# Patient Record
Sex: MALE | Race: WHITE | Employment: OTHER | URBAN - METROPOLITAN AREA
[De-identification: names, ages, dates, MRNs, and addresses within clinical notes are randomized per-mention and may not be internally consistent; named-entity substitution may affect disease eponyms.]

---

## 2017-10-11 ENCOUNTER — OFFICE VISIT (OUTPATIENT)
Dept: CARDIOLOGY CLINIC | Age: 62
End: 2017-10-11

## 2017-10-11 VITALS
HEIGHT: 70 IN | DIASTOLIC BLOOD PRESSURE: 78 MMHG | WEIGHT: 273 LBS | HEART RATE: 82 BPM | BODY MASS INDEX: 39.08 KG/M2 | SYSTOLIC BLOOD PRESSURE: 128 MMHG

## 2017-10-11 DIAGNOSIS — I25.10 CORONARY ARTERY DISEASE INVOLVING NATIVE CORONARY ARTERY OF NATIVE HEART WITHOUT ANGINA PECTORIS: Primary | ICD-10-CM

## 2017-10-11 DIAGNOSIS — E78.2 MIXED HYPERLIPIDEMIA: ICD-10-CM

## 2017-10-11 DIAGNOSIS — Z72.0 TOBACCO ABUSE: ICD-10-CM

## 2017-10-11 DIAGNOSIS — I10 ESSENTIAL HYPERTENSION: ICD-10-CM

## 2017-10-11 PROCEDURE — 99214 OFFICE O/P EST MOD 30 MIN: CPT | Performed by: INTERNAL MEDICINE

## 2017-10-11 RX ORDER — ATORVASTATIN CALCIUM 40 MG/1
40 TABLET, FILM COATED ORAL NIGHTLY
Qty: 30 TABLET | Refills: 11 | Status: SHIPPED | OUTPATIENT
Start: 2017-10-11 | End: 2018-09-27 | Stop reason: SDUPTHER

## 2017-10-11 RX ORDER — ISOSORBIDE MONONITRATE 30 MG/1
30 TABLET, EXTENDED RELEASE ORAL DAILY
Qty: 30 TABLET | Refills: 11 | Status: SHIPPED | OUTPATIENT
Start: 2017-10-11 | End: 2018-09-27 | Stop reason: SDUPTHER

## 2017-10-11 RX ORDER — NITROGLYCERIN 0.4 MG/1
0.4 TABLET SUBLINGUAL EVERY 5 MIN PRN
Qty: 25 TABLET | Refills: 3 | Status: SHIPPED | OUTPATIENT
Start: 2017-10-11 | End: 2018-11-12 | Stop reason: SDUPTHER

## 2017-10-11 RX ORDER — AMLODIPINE BESYLATE 5 MG/1
5 TABLET ORAL DAILY
Qty: 30 TABLET | Refills: 11 | Status: SHIPPED | OUTPATIENT
Start: 2017-10-11 | End: 2018-09-27 | Stop reason: SDUPTHER

## 2017-10-11 NOTE — PATIENT INSTRUCTIONS
Recommendation:  - He is doing fairly well on his current antianginal therapy. 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 We ordered lipid panel on his last visit a year ago, but were not done. His triglycerides were elevated likely due to non-compliance with diet. I educated him about adherence to low fat and salt diet. Will repeat his lipid panel and LFTs. He was counseled for smoking cessation.   - He will follow up with me in 12 months.

## 2017-10-11 NOTE — PROGRESS NOTES
Section of Cardiology                                     Cardiovascular Evaluation      PATIENT: Daxa Harp  DATE: 10/11/2017  MRN: [id number]  CSN: [id number]  : 1955    Primary Care Doctor: No primary care provider on file. Reason for evaluation:   1 Year Follow Up; Coronary Artery Disease; and Shortness of Breath      History of present illness:  Daxa Harp is a 58 y.o. patient who presents for hospital follow up. He presented with chest pain. He underwent a stress test and cardiac cath. Medical management was recommended for branch vessel CAD.     Today he reports he is here for follow up of CAD, HTN and HLD. He  feels well over all. He states he has some SOB and is still smoking. He has recently retired. He has not had any further chest pain. He is tolerating his medications well. He denies chest pain, palpitations, syncope, dizziness or shortness of breath. Past Medical History:   has a past medical history of COPD (chronic obstructive pulmonary disease) ([facility] [address] Surgical History:   has a past surgical history that includes Coronary angioplasty (06/10/2016). Social History:   reports that he has been smoking Cigarettes. He has been smoking about 0.75 packs per day. He does not have any smokeless tobacco history on file. He reports that he does not drink alcohol or use drugs. Family History:  No evidence for sudden cardiac death or premature CAD    Home Medications:  Reviewed and are listed in nursing record.  and/or listed below  Current Outpatient Prescriptions   Medication Sig Dispense Refill    amLODIPine (NORVASC) 5 MG tablet Take 1 tablet by mouth daily 30 tablet 11    isosorbide mononitrate (IMDUR) 30 MG extended release tablet Take 1 tablet by mouth daily 30 tablet 11    metoprolol tartrate (LOPRESSOR) 25 MG tablet Take 1 tablet by mouth 2 times daily 60 tablet 11    atorvastatin (LIPITOR) 40 MG tablet Take 1 tablet by mouth nightly 30 tablet 11    nicotine (NICODERM CQ) 14 MG/24HR Place 1 patch onto the skin daily 30 patch 3    nitroGLYCERIN (NITROSTAT) 0.4 MG SL tablet Place 1 tablet under the tongue every 5 minutes as needed for Chest pain 25 tablet 3    aspirin 81 MG chewable tablet Take 1 tablet by mouth daily 30 tablet 3     No current facility-administered medications for this visit. Allergies:  Valium     Review of Systems:   All 14 point review of symptoms completed. Pertinent positives identified in the HPI, all other review of symptoms negative. Physical Examination:    /78   Pulse 82   Ht 5' 10\" (1.778 m)   Wt 273 lb (123.8 kg)   BMI 39.17 kg/m²        General Appearance:  Alert, cooperative, no distress, appears stated age       Head:  Normocephalic, without obvious abnormality, atraumatic   Eyes:  PERRL, conjunctiva/corneas clear       Nose: Nares normal, no drainage or sinus tenderness   Throat: Lips, mucosa, and tongue normal       Neck: Supple, symmetrical, trachea midline, no adenopathy, thyroid: not enlarged, symmetric, no tenderness/mass/nodules, no carotid bruit or JVD       Lungs:   Clear to auscultation bilaterally, respirations unlabored   Chest Wall:  No tenderness or deformity       Heart:  Regular rhythm and normal rate; S1, S2 are normal; no murmur noted; no rub or gallop       Abdomen:   Soft, non-tender, bowel sounds active all four quadrants,  no masses, no organomegaly       Extremities: No cyanosis or edema. No deformity of hands. Pulses: 2+ and symmetric       Skin: Skin color, texture, turgor normal. No rashes or lesions       Pysch: Normal mood and affect.  Alert, oriented x 3       Neurologic: Normal gross motor and sensory exam.       DIAGNOSTIC WORK UP:  Lab Data:  CBC:   Lab Results   Component Value Date    WBC 6.9 06/10/2016    HGB 15.6 06/10/2016    HCT 46.2 06/10/2016    MCV 90.6 06/10/2016     06/10/2016     BMP:   Lab Results   Component Value Date     06/10/2016    K 3.8

## 2017-11-08 ENCOUNTER — OFFICE VISIT (OUTPATIENT)
Dept: FAMILY MEDICINE CLINIC | Age: 62
End: 2017-11-08

## 2017-11-08 VITALS
OXYGEN SATURATION: 98 % | HEART RATE: 76 BPM | HEIGHT: 70 IN | WEIGHT: 274.1 LBS | BODY MASS INDEX: 39.24 KG/M2 | DIASTOLIC BLOOD PRESSURE: 78 MMHG | TEMPERATURE: 97.8 F | SYSTOLIC BLOOD PRESSURE: 116 MMHG | RESPIRATION RATE: 16 BRPM

## 2017-11-08 DIAGNOSIS — Z12.5 SCREENING PSA (PROSTATE SPECIFIC ANTIGEN): ICD-10-CM

## 2017-11-08 DIAGNOSIS — R39.89 HARD, FIRM PROSTATE: Primary | ICD-10-CM

## 2017-11-08 DIAGNOSIS — E78.2 MIXED HYPERLIPIDEMIA: ICD-10-CM

## 2017-11-08 DIAGNOSIS — I25.10 CORONARY ARTERY DISEASE DUE TO LIPID RICH PLAQUE: ICD-10-CM

## 2017-11-08 DIAGNOSIS — F17.200 TOBACCO USE DISORDER: ICD-10-CM

## 2017-11-08 DIAGNOSIS — I25.83 CORONARY ARTERY DISEASE DUE TO LIPID RICH PLAQUE: ICD-10-CM

## 2017-11-08 DIAGNOSIS — N40.1 BENIGN PROSTATIC HYPERPLASIA WITH NOCTURIA: ICD-10-CM

## 2017-11-08 DIAGNOSIS — I10 ESSENTIAL HYPERTENSION: ICD-10-CM

## 2017-11-08 DIAGNOSIS — Z12.11 COLON CANCER SCREENING: ICD-10-CM

## 2017-11-08 DIAGNOSIS — R35.1 BENIGN PROSTATIC HYPERPLASIA WITH NOCTURIA: ICD-10-CM

## 2017-11-08 LAB
HEMOCCULT STL QL: NEGATIVE
HEMOCCULT STL QL: NORMAL
HEMOCCULT STL QL: NORMAL

## 2017-11-08 PROCEDURE — 82270 OCCULT BLOOD FECES: CPT | Performed by: FAMILY MEDICINE

## 2017-11-08 PROCEDURE — 99204 OFFICE O/P NEW MOD 45 MIN: CPT | Performed by: FAMILY MEDICINE

## 2017-11-08 RX ORDER — TAMSULOSIN HYDROCHLORIDE 0.4 MG/1
0.4 CAPSULE ORAL DAILY
Qty: 30 CAPSULE | Refills: 0 | Status: SHIPPED | OUTPATIENT
Start: 2017-11-08 | End: 2017-11-29 | Stop reason: SDUPTHER

## 2017-11-08 RX ORDER — NICOTINE 21 MG/24HR
1 PATCH, TRANSDERMAL 24 HOURS TRANSDERMAL EVERY 24 HOURS
Qty: 30 PATCH | Refills: 0 | Status: SHIPPED | OUTPATIENT
Start: 2017-11-08 | End: 2018-03-01 | Stop reason: CLARIF

## 2017-11-08 ASSESSMENT — ENCOUNTER SYMPTOMS
SHORTNESS OF BREATH: 0
BLOOD IN STOOL: 0
CHEST TIGHTNESS: 0
APNEA: 0
VOMITING: 0
NAUSEA: 0
EYE ITCHING: 0
SINUS PAIN: 0
STRIDOR: 0
BACK PAIN: 0
SORE THROAT: 0
ABDOMINAL DISTENTION: 0
RECTAL PAIN: 0
EYE REDNESS: 0
CONSTIPATION: 0
COLOR CHANGE: 0
COUGH: 0
RHINORRHEA: 0
DIARRHEA: 0
CHOKING: 0
WHEEZING: 0
ANAL BLEEDING: 0
PHOTOPHOBIA: 0
VOICE CHANGE: 0
EYE DISCHARGE: 0
ABDOMINAL PAIN: 0
EYE PAIN: 0
TROUBLE SWALLOWING: 0
FACIAL SWELLING: 0
SINUS PRESSURE: 0

## 2017-11-08 NOTE — PATIENT INSTRUCTIONS
Patient Education        Benign Prostatic Hyperplasia: Care Instructions  Your Care Instructions    Benign prostatic hyperplasia, or BPH, is an enlarged prostate gland. The prostate is a small gland that makes some of the fluid in semen. Prostate enlargement happens to almost all men as they age. It is usually not serious. BPH does not cause prostate cancer. As the prostate gets bigger, it may partly block the flow of urine. You may have a hard time getting a urine stream started or completely stopped. BPH can cause dribbling. You may have a weak urine stream, or you may have to urinate more often than you used to, especially at night. Most men find these problems easy to manage. You do not need treatment unless your symptoms bother you a lot or you have other problems, such as bladder infections or stones. In these cases, medicines may help. Surgery is not needed unless the urine flow is blocked or the symptoms do not get better with medicine. Follow-up care is a key part of your treatment and safety. Be sure to make and go to all appointments, and call your doctor if you are having problems. It's also a good idea to know your test results and keep a list of the medicines you take. How can you care for yourself at home? · Take plenty of time to urinate. Try to relax. · Try \"double voiding. \" Urinate as much you can, relax for a few moments, and then try to urinate again. · Sit on the toilet to urinate. · Read or think of other things while you are waiting. · Turn on a faucet, or try to picture running water. Some men find that this helps get their urine flowing. · If dribbling is a problem, wash your penis daily to avoid skin irritation and infection. · Avoid caffeine and alcohol. These drinks will increase how often you need to urinate. Spread your fluid intake throughout the day. If the urge to urinate often wakes you at night, limit your fluid intake in the evening.  Urinate right before you go to

## 2017-11-08 NOTE — PROGRESS NOTES
Subjective:      Patient ID: Joshua Neri is a 58 y.o. male. HPI  Establish as new patient:relocating pcp due to convenience. Presenting w/new complaint of gradual onset mild nocturia x 2years. Not worsening. Associated w/occasional dribbling. Worsened(aggravated) by nothing. Improves with nothing. Self tx:none. PSA:no prior testing. Fhx or personal hx of prostate cancer:no. Denies urinary hisitancy/weak stream/daytime urinary frequency/dysuria/urgency/fever/chills/low back pain/n-v.    HTN:CAD/Hyperlipidemia:per cards. Office notes via EPIC reviewed today. Smokes 1ppd:wishes to quit using nicotine patches. Colonoscopy:wishes to proceed w/referral for routine screening. No prior colonoscopy. Risks vs benefits of PSA screening reviewed:pt' wishes to proceed w/testing. Allergies   Allergen Reactions    Valium        Current Outpatient Prescriptions on File Prior to Visit   Medication Sig Dispense Refill    isosorbide mononitrate (IMDUR) 30 MG extended release tablet Take 1 tablet by mouth daily 30 tablet 11    amLODIPine (NORVASC) 5 MG tablet Take 1 tablet by mouth daily 30 tablet 11    metoprolol tartrate (LOPRESSOR) 25 MG tablet Take 1 tablet by mouth 2 times daily 60 tablet 11    atorvastatin (LIPITOR) 40 MG tablet Take 1 tablet by mouth nightly 30 tablet 11    nitroGLYCERIN (NITROSTAT) 0.4 MG SL tablet Place 1 tablet under the tongue every 5 minutes as needed for Chest pain 25 tablet 3    aspirin 81 MG chewable tablet Take 1 tablet by mouth daily 30 tablet 3    nicotine (NICODERM CQ) 14 MG/24HR Place 1 patch onto the skin daily 30 patch 3     No current facility-administered medications on file prior to visit.         Past Medical History:   Diagnosis Date    CAD (coronary artery disease)     Managed [facility]ology[facility]Dr. Mavis Pena COPD (chronic obstructive pulmonary disease) (HCC)     HTN (hypertension)     Managed by cardiology([facility]):Dr. Mavis Pena Hyperlipidemia     Managed by cardiology([facility]):Dr. Facundo Paula       Past Surgical History:   Procedure Laterality Date    WISDOM TOOTH EXTRACTION         Social History   Substance Use Topics    Smoking status: Current Every Day Smoker     Packs/day: 1.00     Years: 30.00     Types: Cigarettes    Smokeless tobacco: Never Used    Alcohol use No     History   Drug Use No       Family History   Problem Relation Age of Onset    No Known Problems Mother     No Known Problems Father     No Known Problems Sister     Heart Disease Brother     No Known Problems Maternal Grandmother     No Known Problems Maternal Grandfather     No Known Problems Paternal Grandmother     No Known Problems Paternal Grandfather          Review of Systems   Constitutional: Negative for activity change, appetite change, chills, diaphoresis, fatigue, fever and unexpected weight change. Negative for:Difficulty sleeping/night sweats/unexplained weight loss or gain/malaise   HENT: Negative for congestion, dental problem, drooling, ear discharge, ear pain, facial swelling, hearing loss, mouth sores, nosebleeds, postnasal drip, rhinorrhea, sinus pain, sinus pressure, sneezing, sore throat, tinnitus, trouble swallowing and voice change. Negative for:Dizziness/vertigo   Eyes: Negative for photophobia, pain, discharge, redness, itching and visual disturbance. Respiratory: Negative for apnea, cough, choking, chest tightness, shortness of breath, wheezing and stridor. Negative for:Hemoptysis/hoarseness/pain on inspiration. Breasts:tenderness/masses/nipple discharge/skin changes. Cardiovascular: Negative for chest pain, palpitations and leg swelling. Negative for:Syncope/presyncope/lower extremity edema/claudication/PND/irregular heart beats   Gastrointestinal: Negative for abdominal distention, abdominal pain, anal bleeding, blood in stool, constipation, diarrhea, nausea, rectal pain and vomiting. Negative for:Melena/bloating/dysphagia/reflux/loss of appetite   Endocrine: Negative for cold intolerance, heat intolerance, polydipsia, polyphagia and polyuria. Genitourinary: Negative for decreased urine volume, difficulty urinating, discharge, dysuria, enuresis, flank pain, frequency, genital sores, hematuria, penile pain, penile swelling, scrotal swelling, testicular pain and urgency. Musculoskeletal: Negative for arthralgias, back pain, gait problem, joint swelling, myalgias, neck pain and neck stiffness. Skin: Negative for color change, pallor, rash and wound. Neurological: Negative for dizziness, tremors, seizures, syncope, facial asymmetry, speech difficulty, weakness, light-headedness, numbness and headaches. Negative for:Visual disturbance/muscular weakness/paralysis/memory problems. Hematological: Negative for adenopathy. Does not bruise/bleed easily. Negative for:Lymph node tenderness   Psychiatric/Behavioral: Negative for agitation, behavioral problems, confusion, decreased concentration, dysphoric mood, hallucinations, self-injury, sleep disturbance and suicidal ideas. The patient is not nervous/anxious and is not hyperactive. Negative for:Homicidal tendencies(HI)       Objective:   Physical Exam   Constitutional: He is oriented to person, place, and time. Vital signs are normal. He appears well-developed and well-nourished. He is cooperative. He does not have a sickly appearance. No distress. Well hydrated/well appearing. HENT:   Head: Normocephalic and atraumatic. Right Ear: Hearing, tympanic membrane, external ear and ear canal normal.   Left Ear: Hearing, tympanic membrane, external ear and ear canal normal.   Nose: Nose normal.   Mouth/Throat: Uvula is midline, oropharynx is clear and moist and mucous membranes are normal. No oropharyngeal exudate. Eyes: Conjunctivae, EOM and lids are normal. Pupils are equal, round, and reactive to light.    Neck: Trachea labs/diagnostic tests as discussed today & call back for results within 2-7days. Pt' left office in good condition. Advised to go to local ER or call 911 for any worrisome signs/sx including but not limited to worsening of current complaint.   Advised release of medical records from all prior physicians(including PCP/specialists)

## 2017-11-13 LAB
BACTERIA: ABNORMAL /HPF
BILIRUBIN URINE: NEGATIVE
BLOOD, URINE: NEGATIVE
CLARITY: ABNORMAL
COLOR: ABNORMAL
CRYSTALS, UA: ABNORMAL /HPF
EPITHELIAL CELLS, UA: 9 /HPF (ref 0–5)
GLUCOSE URINE: NEGATIVE MG/DL
HYALINE CASTS: 2 /LPF (ref 0–8)
KETONES, URINE: ABNORMAL MG/DL
LEUKOCYTE ESTERASE, URINE: ABNORMAL
MICROSCOPIC EXAMINATION: YES
NITRITE, URINE: NEGATIVE
PH UA: 6
PROTEIN UA: NEGATIVE MG/DL
RBC UA: 5 /HPF (ref 0–4)
SPECIFIC GRAVITY UA: 1.02
URINE TYPE: ABNORMAL
UROBILINOGEN, URINE: 1 E.U./DL
WBC UA: 10 /HPF (ref 0–5)

## 2017-11-14 DIAGNOSIS — R82.998 LEUKOCYTES IN URINE: Primary | ICD-10-CM

## 2017-11-15 LAB — URINE CULTURE, ROUTINE: NORMAL

## 2017-11-29 ENCOUNTER — OFFICE VISIT (OUTPATIENT)
Dept: FAMILY MEDICINE CLINIC | Age: 62
End: 2017-11-29

## 2017-11-29 VITALS
DIASTOLIC BLOOD PRESSURE: 68 MMHG | HEIGHT: 70 IN | SYSTOLIC BLOOD PRESSURE: 116 MMHG | WEIGHT: 275.2 LBS | BODY MASS INDEX: 39.4 KG/M2 | RESPIRATION RATE: 16 BRPM | HEART RATE: 75 BPM

## 2017-11-29 DIAGNOSIS — Z72.0 TOBACCO ABUSE: ICD-10-CM

## 2017-11-29 DIAGNOSIS — I10 ESSENTIAL HYPERTENSION: ICD-10-CM

## 2017-11-29 DIAGNOSIS — R35.1 BENIGN PROSTATIC HYPERPLASIA WITH NOCTURIA: Primary | ICD-10-CM

## 2017-11-29 DIAGNOSIS — N40.1 BENIGN PROSTATIC HYPERPLASIA WITH NOCTURIA: Primary | ICD-10-CM

## 2017-11-29 DIAGNOSIS — R39.89 HARD, FIRM PROSTATE: ICD-10-CM

## 2017-11-29 PROCEDURE — 99213 OFFICE O/P EST LOW 20 MIN: CPT | Performed by: FAMILY MEDICINE

## 2017-11-29 RX ORDER — TAMSULOSIN HYDROCHLORIDE 0.4 MG/1
0.4 CAPSULE ORAL DAILY
Qty: 30 CAPSULE | Refills: 0 | Status: SHIPPED | OUTPATIENT
Start: 2017-11-29 | End: 2018-03-01 | Stop reason: CLARIF

## 2017-11-29 ASSESSMENT — ENCOUNTER SYMPTOMS
COUGH: 0
ABDOMINAL DISTENTION: 0
ABDOMINAL PAIN: 0
CHEST TIGHTNESS: 0
GASTROINTESTINAL NEGATIVE: 1
SHORTNESS OF BREATH: 0

## 2017-11-29 NOTE — PROGRESS NOTES
Subjective:      Patient ID: Druan Ness is a 58 y.o. male. HPI    Results for Wm Ramey (MRN [id number]) as of 11/29/2017 11:09   Ref. Range 11/13/2017 15:03   Color, UA Latest Ref Range: Straw/Yellow  DK YELLOW   Clarity, UA Latest Ref Range: Clear  CLOUDY (A)   Glucose, UA Latest Ref Range: Negative mg/dL Negative   Bilirubin, Urine Latest Ref Range: Negative  Negative   Ketones, Urine Latest Ref Range: Negative mg/dL TRACE (A)   Specific Dorena, UA Latest Ref Range: 1.005 - 1.030  1.023   Blood, Urine Latest Ref Range: Negative  Negative   pH, UA Latest Ref Range: 5.0 - 8.0  6.0   Protein, UA Latest Ref Range: Negative mg/dL Negative   Urobilinogen, Urine Latest Ref Range: <2.0 E.U./dL 1.0   Nitrite, Urine Latest Ref Range: Negative  Negative   Leukocyte Esterase, Urine Latest Ref Range: Negative  SMALL (A)   Urine Type Unknown Clean catch   Hyaline Casts, UA Latest Ref Range: 0 - 8 /LPF 2   WBC, UA Latest Ref Range: 0 - 5 /HPF 10 (H)   RBC, UA Latest Ref Range: 0 - 4 /HPF 5 (H)   Epi Cells Latest Ref Range: 0 - 5 /HPF 9 (H)   Bacteria, UA Latest Units: /HPF 1+ (A)   Crystals Latest Units: /HPF 1+ Ca. Oxalate (A)   Microscopic Examination Unknown YES   Urine Culture, Routine Unknown No growth at 18-3... F/u to [facility] well with flomax. No med side effects. Associated w/occasional dribbling that is better. Worsened(aggravated) by nothing. Improves with flomax. PSA:lab pending. Urology appt(hard firm prostate):pending:pt' states he has been contacted bu urology & will call them back to schedule. Fhx or personal hx of prostate cancer:no. Denies urinary hisitancy/weak stream/daytime urinary frequency/dysuria/urgency/fever/chills/low back pain/n-v.    UA see above:calcium oxalate crystals\"per pt' no sx. Denies flank pain. HTN:CAD/Hyperlipidemia:per cards. Doing well. Smokes 1ppd:wishes to quit using nicotine patches.       Allergies   Allergen Reactions    Valium        Current Outpatient Prescriptions on File Prior to Visit   Medication Sig Dispense Refill    tamsulosin (FLOMAX) 0.4 MG capsule Take 1 capsule by mouth daily 30 capsule 0    isosorbide mononitrate (IMDUR) 30 MG extended release tablet Take 1 tablet by mouth daily 30 tablet 11    amLODIPine (NORVASC) 5 MG tablet Take 1 tablet by mouth daily 30 tablet 11    metoprolol tartrate (LOPRESSOR) 25 MG tablet Take 1 tablet by mouth 2 times daily 60 tablet 11    atorvastatin (LIPITOR) 40 MG tablet Take 1 tablet by mouth nightly 30 tablet 11    aspirin 81 MG chewable tablet Take 1 tablet by mouth daily 30 tablet 3    nicotine (NICODERM CQ) 21 MG/24HR Place 1 patch onto the skin every 24 hours 30 patch 0    nitroGLYCERIN (NITROSTAT) 0.4 MG SL tablet Place 1 tablet under the tongue every 5 minutes as needed for Chest pain 25 tablet 3    nicotine (NICODERM CQ) 14 MG/24HR Place 1 patch onto the skin daily 30 patch 3     No current facility-administered medications on file prior to visit. Past Medical History:   Diagnosis Date    Benign prostatic hyperplasia with nocturia 11/8/2017    CAD (coronary artery disease)     Managed [facility]ology([facility]):Dr. Agatha Santiago COPD (chronic obstructive pulmonary disease) ([facility] [address]     HTN (hypertension)     Managed by cardiology([facility]):Dr. Agatha Santiago Hyperlipidemia     Managed by cardiology([facility]):Dr. Cheyanne Cohen           Social History   Substance Use Topics    Smoking status: Current Every Day Smoker     Packs/day: 1.00     Years: 30.00     Types: Cigarettes    Smokeless tobacco: Never Used    Alcohol use No     History   Drug Use No           Review of Systems   Constitutional: Negative for activity change, appetite change, chills, diaphoresis, fatigue, fever and unexpected weight change. Respiratory: Negative for cough, chest tightness and shortness of breath. Cardiovascular: Negative for chest pain. Gastrointestinal: Negative.   Negative for abdominal distention and abdominal pain. Genitourinary: Negative for decreased urine volume, difficulty urinating, discharge, dysuria, enuresis, flank pain, frequency, genital sores, hematuria, penile pain, penile swelling, scrotal swelling, testicular pain and urgency. Neurological: Negative for dizziness. Hematological: Negative for adenopathy. Objective:   Physical Exam   Constitutional: Vital signs are normal. He appears well-developed and well-nourished. He is cooperative. He does not have a sickly appearance. No distress. HENT:   Mouth/Throat: Oropharynx is clear and moist and mucous membranes are normal.   Eyes: Conjunctivae are normal. Pupils are equal, round, and reactive to light. No scleral icterus. Cardiovascular: Normal rate, regular rhythm and normal heart sounds. Pulmonary/Chest: Effort normal and breath sounds normal.   CTAB,good AE bilaterally   Abdominal: Soft. Bowel sounds are normal. He exhibits no distension. There is no tenderness. Lymphadenopathy:     He has no cervical adenopathy. Neurological: He is alert. Skin: He is not diaphoretic. No pallor. Assessment:      1. Benign prostatic hyperplasia with nocturia  VSS/well appearing. Stable. Continue med. tamsulosin (FLOMAX) 0.4 MG capsule   2. Hard, firm prostate  Keep urology appt for 2nd opinion. 3. Tobacco abuse  Counseled. Strongly encouraged to quit. 4. Essential hypertension  Stable. Plan:      Obtain labs/diagnostic tests as discussed today & call back for results within 2-7days:has lab orders from prior visit on 11/8/17. Pt' left office in good condition. Advised to go to local ER or call 911 for any worrisome signs/sx.

## 2017-12-06 DIAGNOSIS — R35.1 BENIGN PROSTATIC HYPERPLASIA WITH NOCTURIA: ICD-10-CM

## 2017-12-06 DIAGNOSIS — N40.1 BENIGN PROSTATIC HYPERPLASIA WITH NOCTURIA: ICD-10-CM

## 2017-12-06 RX ORDER — TAMSULOSIN HYDROCHLORIDE 0.4 MG/1
CAPSULE ORAL
Qty: 30 CAPSULE | Refills: 0 | Status: SHIPPED | OUTPATIENT
Start: 2017-12-06 | End: 2017-12-27 | Stop reason: SDUPTHER

## 2017-12-27 DIAGNOSIS — R35.1 BENIGN PROSTATIC HYPERPLASIA WITH NOCTURIA: ICD-10-CM

## 2017-12-27 DIAGNOSIS — N40.1 BENIGN PROSTATIC HYPERPLASIA WITH NOCTURIA: ICD-10-CM

## 2017-12-27 RX ORDER — TAMSULOSIN HYDROCHLORIDE 0.4 MG/1
CAPSULE ORAL
Qty: 30 CAPSULE | Refills: 0 | Status: SHIPPED | OUTPATIENT
Start: 2017-12-27 | End: 2018-01-29 | Stop reason: SDUPTHER

## 2018-01-03 ENCOUNTER — OFFICE VISIT (OUTPATIENT)
Dept: FAMILY MEDICINE CLINIC | Age: 63
End: 2018-01-03

## 2018-01-03 VITALS
WEIGHT: 277.3 LBS | HEART RATE: 84 BPM | RESPIRATION RATE: 16 BRPM | SYSTOLIC BLOOD PRESSURE: 114 MMHG | DIASTOLIC BLOOD PRESSURE: 67 MMHG | BODY MASS INDEX: 39.7 KG/M2 | HEIGHT: 70 IN

## 2018-01-03 DIAGNOSIS — E78.2 MIXED HYPERLIPIDEMIA: ICD-10-CM

## 2018-01-03 DIAGNOSIS — R39.89 HARD, FIRM PROSTATE: ICD-10-CM

## 2018-01-03 DIAGNOSIS — N40.1 BENIGN PROSTATIC HYPERPLASIA WITH NOCTURIA: ICD-10-CM

## 2018-01-03 DIAGNOSIS — R35.1 BENIGN PROSTATIC HYPERPLASIA WITH NOCTURIA: ICD-10-CM

## 2018-01-03 DIAGNOSIS — R82.90 ABNORMAL URINALYSIS: ICD-10-CM

## 2018-01-03 DIAGNOSIS — Z72.0 TOBACCO ABUSE: ICD-10-CM

## 2018-01-03 DIAGNOSIS — I10 ESSENTIAL HYPERTENSION: Primary | ICD-10-CM

## 2018-01-03 DIAGNOSIS — Z12.11 COLON CANCER SCREENING: ICD-10-CM

## 2018-01-03 DIAGNOSIS — I10 ESSENTIAL HYPERTENSION: ICD-10-CM

## 2018-01-03 DIAGNOSIS — Z12.5 SCREENING PSA (PROSTATE SPECIFIC ANTIGEN): ICD-10-CM

## 2018-01-03 LAB
ANION GAP SERPL CALCULATED.3IONS-SCNC: 15 MMOL/L (ref 3–16)
BUN BLDV-MCNC: 8 MG/DL (ref 7–20)
CALCIUM SERPL-MCNC: 9.1 MG/DL (ref 8.3–10.6)
CHLORIDE BLD-SCNC: 99 MMOL/L (ref 99–110)
CO2: 27 MMOL/L (ref 21–32)
CREAT SERPL-MCNC: 0.7 MG/DL (ref 0.8–1.3)
GFR AFRICAN AMERICAN: >60
GFR NON-AFRICAN AMERICAN: >60
GLUCOSE BLD-MCNC: 111 MG/DL (ref 70–99)
POTASSIUM SERPL-SCNC: 4.3 MMOL/L (ref 3.5–5.1)
PROSTATE SPECIFIC ANTIGEN: 4.14 NG/ML (ref 0–4)
SODIUM BLD-SCNC: 141 MMOL/L (ref 136–145)

## 2018-01-03 PROCEDURE — 99213 OFFICE O/P EST LOW 20 MIN: CPT | Performed by: FAMILY MEDICINE

## 2018-01-03 ASSESSMENT — ENCOUNTER SYMPTOMS
GASTROINTESTINAL NEGATIVE: 1
COUGH: 0
SHORTNESS OF BREATH: 0
ABDOMINAL PAIN: 0
CHEST TIGHTNESS: 0
ABDOMINAL DISTENTION: 0

## 2018-01-04 ENCOUNTER — TELEPHONE (OUTPATIENT)
Dept: CARDIOLOGY CLINIC | Age: 63
End: 2018-01-04

## 2018-01-04 DIAGNOSIS — R39.89 HARD, FIRM PROSTATE: ICD-10-CM

## 2018-01-04 DIAGNOSIS — N40.1 BENIGN PROSTATIC HYPERPLASIA WITH NOCTURIA: ICD-10-CM

## 2018-01-04 DIAGNOSIS — R35.1 BENIGN PROSTATIC HYPERPLASIA WITH NOCTURIA: ICD-10-CM

## 2018-01-04 DIAGNOSIS — R97.20 PSA ELEVATION: Primary | ICD-10-CM

## 2018-01-04 NOTE — TELEPHONE ENCOUNTER
Pt was referred to the Urology group but they do not except pt insurance Noe Redman.  Pt wants to get another referral to different urology MD. Thanks

## 2018-01-29 DIAGNOSIS — N40.1 BENIGN PROSTATIC HYPERPLASIA WITH NOCTURIA: ICD-10-CM

## 2018-01-29 DIAGNOSIS — R35.1 BENIGN PROSTATIC HYPERPLASIA WITH NOCTURIA: ICD-10-CM

## 2018-01-29 RX ORDER — TAMSULOSIN HYDROCHLORIDE 0.4 MG/1
CAPSULE ORAL
Qty: 30 CAPSULE | Refills: 0 | Status: SHIPPED | OUTPATIENT
Start: 2018-01-29 | End: 2018-03-01 | Stop reason: CLARIF

## 2018-02-02 DIAGNOSIS — R35.1 BENIGN PROSTATIC HYPERPLASIA WITH NOCTURIA: ICD-10-CM

## 2018-02-02 DIAGNOSIS — N40.1 BENIGN PROSTATIC HYPERPLASIA WITH NOCTURIA: ICD-10-CM

## 2018-02-02 RX ORDER — TAMSULOSIN HYDROCHLORIDE 0.4 MG/1
CAPSULE ORAL
Qty: 30 CAPSULE | Refills: 0 | Status: SHIPPED | OUTPATIENT
Start: 2018-02-02 | End: 2018-04-16 | Stop reason: SDUPTHER

## 2018-03-01 ENCOUNTER — OFFICE VISIT (OUTPATIENT)
Dept: FAMILY MEDICINE CLINIC | Age: 63
End: 2018-03-01

## 2018-03-01 VITALS
RESPIRATION RATE: 16 BRPM | HEART RATE: 85 BPM | HEIGHT: 70 IN | TEMPERATURE: 97.5 F | WEIGHT: 286.7 LBS | OXYGEN SATURATION: 98 % | BODY MASS INDEX: 41.04 KG/M2 | DIASTOLIC BLOOD PRESSURE: 66 MMHG | SYSTOLIC BLOOD PRESSURE: 108 MMHG

## 2018-03-01 DIAGNOSIS — E78.2 MIXED HYPERLIPIDEMIA: ICD-10-CM

## 2018-03-01 DIAGNOSIS — I25.10 CORONARY ARTERY DISEASE INVOLVING NATIVE CORONARY ARTERY OF NATIVE HEART WITHOUT ANGINA PECTORIS: ICD-10-CM

## 2018-03-01 DIAGNOSIS — R35.1 BENIGN PROSTATIC HYPERPLASIA WITH NOCTURIA: ICD-10-CM

## 2018-03-01 DIAGNOSIS — N40.1 BENIGN PROSTATIC HYPERPLASIA WITH NOCTURIA: ICD-10-CM

## 2018-03-01 DIAGNOSIS — R39.89 HARD, FIRM PROSTATE: ICD-10-CM

## 2018-03-01 DIAGNOSIS — E66.01 MORBID OBESITY (HCC): ICD-10-CM

## 2018-03-01 DIAGNOSIS — R73.9 BLOOD GLUCOSE ELEVATED: ICD-10-CM

## 2018-03-01 DIAGNOSIS — F17.200 TOBACCO USE DISORDER: ICD-10-CM

## 2018-03-01 DIAGNOSIS — R97.20 PSA ELEVATION: ICD-10-CM

## 2018-03-01 DIAGNOSIS — I10 ESSENTIAL HYPERTENSION: Primary | ICD-10-CM

## 2018-03-01 PROCEDURE — 4004F PT TOBACCO SCREEN RCVD TLK: CPT | Performed by: FAMILY MEDICINE

## 2018-03-01 PROCEDURE — G8417 CALC BMI ABV UP PARAM F/U: HCPCS | Performed by: FAMILY MEDICINE

## 2018-03-01 PROCEDURE — G8484 FLU IMMUNIZE NO ADMIN: HCPCS | Performed by: FAMILY MEDICINE

## 2018-03-01 PROCEDURE — 3017F COLORECTAL CA SCREEN DOC REV: CPT | Performed by: FAMILY MEDICINE

## 2018-03-01 PROCEDURE — G8427 DOCREV CUR MEDS BY ELIG CLIN: HCPCS | Performed by: FAMILY MEDICINE

## 2018-03-01 PROCEDURE — 99214 OFFICE O/P EST MOD 30 MIN: CPT | Performed by: FAMILY MEDICINE

## 2018-03-01 PROCEDURE — G8599 NO ASA/ANTIPLAT THER USE RNG: HCPCS | Performed by: FAMILY MEDICINE

## 2018-03-01 ASSESSMENT — ENCOUNTER SYMPTOMS
COUGH: 0
GASTROINTESTINAL NEGATIVE: 1
SHORTNESS OF BREATH: 0
BLOOD IN STOOL: 0
ABDOMINAL DISTENTION: 0
ANAL BLEEDING: 0
VOMITING: 0
CONSTIPATION: 0
ABDOMINAL PAIN: 0
DIARRHEA: 0
NAUSEA: 0
CHEST TIGHTNESS: 0
RECTAL PAIN: 0

## 2018-03-01 NOTE — PATIENT INSTRUCTIONS
arms.  ¨ Lightheadedness or sudden weakness. ¨ A fast or irregular heartbeat. ? · You have symptoms of a stroke. These may include:  ¨ Sudden numbness, tingling, weakness, or loss of movement in your face, arm, or leg, especially on only one side of your body. ¨ Sudden vision changes. ¨ Sudden trouble speaking. ¨ Sudden confusion or trouble understanding simple statements. ¨ Sudden problems with walking or balance. ¨ A sudden, severe headache that is different from past headaches. ? · You have severe back or belly pain. ?Do not wait until your blood pressure comes down on its own. Get help right away. ?Call your doctor now or seek immediate care if:  ? · Your blood pressure is much higher than normal (such as 180/110 or higher), but you don't have symptoms. ? · You think high blood pressure is causing symptoms, such as:  ¨ Severe headache. ¨ Blurry vision. ? Watch closely for changes in your health, and be sure to contact your doctor if:  ? · Your blood pressure measures 140/90 or higher at least 2 times. That means the top number is 140 or higher or the bottom number is 90 or higher, or both. ? · You think you may be having side effects from your blood pressure medicine. ? · Your blood pressure is usually normal, but it goes above normal at least 2 times. Where can you learn more? Go to [email or web address] and sign in to your [employer] account. Enter Z219 in the [employer] box to learn more about \"High Blood Pressure: Care Instructions. \"     If you do not have an account, please click on the \"Sign Up Now\" link. Current as of: Carolyn 10, 2017  Content Version: 11.5  © 7190-2447 [employer], [employer]. Care instructions adapted under license by [facility] ([facility]).  If you have questions about a medical condition or this instruction, always ask your healthcare professional. Ashley Dupont any warranty or liability for your use of this

## 2018-03-01 NOTE — PROGRESS NOTES
Negative. Negative for abdominal distention, abdominal pain, anal bleeding, blood in stool, constipation, diarrhea, nausea, rectal pain and vomiting. Endocrine: Negative for cold intolerance, heat intolerance, polydipsia, polyphagia and polyuria. Genitourinary: Negative for decreased urine volume, difficulty urinating, discharge, dysuria, enuresis, flank pain, frequency, genital sores, hematuria, penile pain, penile swelling, scrotal swelling, testicular pain and urgency. Neurological: Negative for dizziness and weakness. Hematological: Negative for adenopathy. Psychiatric/Behavioral: Negative for sleep disturbance. Objective:   Physical Exam   Constitutional: He is oriented to person, place, and time. Vital signs are normal. He appears well-developed and well-nourished. He is cooperative. He does not have a sickly appearance. No distress. HENT:   Nose: Nose normal.   Mouth/Throat: Uvula is midline, oropharynx is clear and moist and mucous membranes are normal.   Hearing intact to nml conversation   Eyes: Conjunctivae, EOM and lids are normal. Pupils are equal, round, and reactive to light. No scleral icterus. Neck: Trachea normal and normal range of motion. Neck supple. Cardiovascular: Normal rate, regular rhythm, normal heart sounds, intact distal pulses and normal pulses. No ankle edema. Pulmonary/Chest: Effort normal and breath sounds normal.   CTAB,good AE bilaterally   Abdominal: Soft. Normal appearance and bowel sounds are normal. He exhibits no distension and no mass. There is no hepatomegaly. There is no tenderness. Lymphadenopathy:     He has no cervical adenopathy. No supraclavicular LAD. Neurological: He is alert and oriented to person, place, and time. Skin: Skin is warm, dry and intact. He is not diaphoretic. No pallor. Good skin turgor. Capillary refill=2-3 secs. Psychiatric: He has a normal mood and affect. Good eye contact. Assessment:         1.

## 2018-03-21 ENCOUNTER — OFFICE VISIT (OUTPATIENT)
Dept: FAMILY MEDICINE CLINIC | Age: 63
End: 2018-03-21

## 2018-03-21 VITALS
HEIGHT: 70 IN | OXYGEN SATURATION: 98 % | WEIGHT: 280.7 LBS | BODY MASS INDEX: 40.18 KG/M2 | SYSTOLIC BLOOD PRESSURE: 114 MMHG | HEART RATE: 83 BPM | DIASTOLIC BLOOD PRESSURE: 70 MMHG | RESPIRATION RATE: 16 BRPM | TEMPERATURE: 97.9 F

## 2018-03-21 DIAGNOSIS — I83.90 VARICOSE VEIN OF LEG: ICD-10-CM

## 2018-03-21 DIAGNOSIS — I10 ESSENTIAL HYPERTENSION: ICD-10-CM

## 2018-03-21 DIAGNOSIS — R60.0 MILD PERIPHERAL EDEMA: Primary | ICD-10-CM

## 2018-03-21 DIAGNOSIS — I83.93 SPIDER VEINS OF BOTH LOWER EXTREMITIES: ICD-10-CM

## 2018-03-21 DIAGNOSIS — R60.0 MILD PERIPHERAL EDEMA: ICD-10-CM

## 2018-03-21 DIAGNOSIS — I25.10 CORONARY ARTERY DISEASE INVOLVING NATIVE CORONARY ARTERY OF NATIVE HEART WITHOUT ANGINA PECTORIS: ICD-10-CM

## 2018-03-21 LAB
ALBUMIN SERPL-MCNC: 3.9 G/DL (ref 3.4–5)
ANION GAP SERPL CALCULATED.3IONS-SCNC: 13 MMOL/L (ref 3–16)
BUN BLDV-MCNC: 7 MG/DL (ref 7–20)
CALCIUM SERPL-MCNC: 8.7 MG/DL (ref 8.3–10.6)
CHLORIDE BLD-SCNC: 102 MMOL/L (ref 99–110)
CO2: 26 MMOL/L (ref 21–32)
CREAT SERPL-MCNC: 0.7 MG/DL (ref 0.8–1.3)
GFR AFRICAN AMERICAN: >60
GFR NON-AFRICAN AMERICAN: >60
GLUCOSE BLD-MCNC: 111 MG/DL (ref 70–99)
PHOSPHORUS: 3 MG/DL (ref 2.5–4.9)
POTASSIUM SERPL-SCNC: 4 MMOL/L (ref 3.5–5.1)
PRO-BNP: 119 PG/ML (ref 0–124)
SODIUM BLD-SCNC: 141 MMOL/L (ref 136–145)

## 2018-03-21 PROCEDURE — 4004F PT TOBACCO SCREEN RCVD TLK: CPT | Performed by: FAMILY MEDICINE

## 2018-03-21 PROCEDURE — 99214 OFFICE O/P EST MOD 30 MIN: CPT | Performed by: FAMILY MEDICINE

## 2018-03-21 PROCEDURE — G8599 NO ASA/ANTIPLAT THER USE RNG: HCPCS | Performed by: FAMILY MEDICINE

## 2018-03-21 PROCEDURE — G8427 DOCREV CUR MEDS BY ELIG CLIN: HCPCS | Performed by: FAMILY MEDICINE

## 2018-03-21 PROCEDURE — 3017F COLORECTAL CA SCREEN DOC REV: CPT | Performed by: FAMILY MEDICINE

## 2018-03-21 PROCEDURE — G8417 CALC BMI ABV UP PARAM F/U: HCPCS | Performed by: FAMILY MEDICINE

## 2018-03-21 PROCEDURE — G8484 FLU IMMUNIZE NO ADMIN: HCPCS | Performed by: FAMILY MEDICINE

## 2018-03-21 RX ORDER — HYDROCHLOROTHIAZIDE 12.5 MG/1
12.5 TABLET ORAL DAILY
Qty: 30 TABLET | Refills: 0 | Status: SHIPPED | OUTPATIENT
Start: 2018-03-21 | End: 2018-04-16 | Stop reason: SDUPTHER

## 2018-03-21 ASSESSMENT — ENCOUNTER SYMPTOMS
ABDOMINAL PAIN: 0
BLOOD IN STOOL: 0
SHORTNESS OF BREATH: 0
ABDOMINAL DISTENTION: 0
RECTAL PAIN: 0
COUGH: 0
NAUSEA: 0
APNEA: 0
ANAL BLEEDING: 0
CONSTIPATION: 0
VOMITING: 0
CHOKING: 0
STRIDOR: 0
CHEST TIGHTNESS: 0
DIARRHEA: 0
WHEEZING: 0

## 2018-03-21 NOTE — PROGRESS NOTES
Review of Systems   Constitutional: Negative for activity change, appetite change, chills, diaphoresis, fatigue, fever and unexpected weight change. Respiratory: Negative for apnea, cough, choking, chest tightness, shortness of breath, wheezing and stridor. Cardiovascular: Positive for leg swelling. Negative for chest pain and palpitations. Gastrointestinal: Negative for abdominal distention, abdominal pain, anal bleeding, blood in stool, constipation, diarrhea, nausea, rectal pain and vomiting. Genitourinary: Negative. Musculoskeletal: Negative for arthralgias. Skin: Negative for pallor, rash and wound. Neurological: Negative for dizziness, weakness, light-headedness and numbness. Hematological: Negative for adenopathy. Objective:   Physical Exam   Constitutional: He is oriented to person, place, and time. Vital signs are normal. He appears well-developed and well-nourished. He is cooperative. He does not have a sickly appearance. No distress. HENT:   Nose: Nose normal.   Mouth/Throat: Uvula is midline, oropharynx is clear and moist and mucous membranes are normal.   Hearing intact to nml conversation   Eyes: Conjunctivae, EOM and lids are normal. Pupils are equal, round, and reactive to light. Neck: Trachea normal and normal range of motion. Neck supple. No JVD present. Carotid bruit is not present. Cardiovascular: Normal rate, regular rhythm, normal heart sounds, intact distal pulses and normal pulses. Pulses:       Dorsalis pedis pulses are 2+ on the right side, and 2+ on the left side. Posterior tibial pulses are 2+ on the right side, and 2+ on the left side. +spider veins/varicose veins noted bilateral lower legs. 1+ non-tender feet/ankle bilateral non-pitting edema. Pulmonary/Chest: Effort normal and breath sounds normal. No accessory muscle usage. No respiratory distress. He has no decreased breath sounds. He has no wheezes. He has no rhonchi.  He has no rales.   CTAB,good AE bilaterally   Abdominal: Soft. Normal appearance and bowel sounds are normal. He exhibits no distension and no mass. There is no splenomegaly or hepatomegaly. There is no tenderness. There is no rebound and no guarding. Musculoskeletal:        Right hip: Normal.        Left hip: Normal.        Right knee: Normal.        Left knee: Normal.        Right ankle: He exhibits swelling. He exhibits normal range of motion, no ecchymosis, no deformity, no laceration and normal pulse. No tenderness. No lateral malleolus, no medial malleolus, no AITFL, no CF ligament, no posterior TFL, no head of 5th metatarsal and no proximal fibula tenderness found. Achilles tendon normal. Achilles tendon exhibits no pain, no defect and normal Solomon's test results. Left ankle: He exhibits swelling. He exhibits normal range of motion, no ecchymosis, no deformity, no laceration and normal pulse. No tenderness. No lateral malleolus, no medial malleolus, no AITFL, no CF ligament, no posterior TFL, no head of 5th metatarsal and no proximal fibula tenderness found. Achilles tendon normal.        Right upper leg: Normal.        Left upper leg: Normal.        Right lower leg: Normal.        Left lower leg: Normal.        Right foot: There is swelling. There is normal range of motion, no tenderness, no bony tenderness, normal capillary refill, no crepitus, no deformity and no laceration. Left foot: There is swelling. There is normal range of motion, no tenderness, no bony tenderness, normal capillary refill, no crepitus, no deformity and no laceration. Negative Jaclyn's sign bilaterally. No signs/sx/exam findings concerning for DVT/infection/compartment syndrome. BLE:nml neurosensory-motor-vascular exam.   Lymphadenopathy:     He has no cervical adenopathy. Neurological: He is alert and oriented to person, place, and time. He has normal reflexes. Skin: Skin is warm, dry and intact. No rash noted.  He is not diaphoretic. No cyanosis. No pallor. Good skin turgor. Capillary refill=2-3 secs. Psychiatric: He has a normal mood and affect. Assessment:      1. Mild peripheral lower leg edema  VSS/well appearing. Most likely due to venous insufficiency. otc comp hosiery Grade 1;diuretic. Possible med side effects reviewed. Pt' wishes to proceed w/med. Due to risk factors will check echo.      hydrochlorothiazide (HYDRODIURIL) 12.5 MG tablet    RENAL FUNCTION PANEL;BNP    ECHO Complete 2D W Doppler W Color   2. CAD  Stable. Per cards. ECHO Complete 2D W Doppler W Color   3. Essential hypertension  Stable. RENAL FUNCTION PANEL    ECHO Complete 2D W Doppler W Color   4. Spider veins of both lower extremities  See note#1. 5. Varicose vein of legs  See note#1. Plan:      Call or return to clinic prn if these symptoms worsen or fail to improve as anticipated. Pt' left office in good condition. Advised to go to local ER or call 911 for any worrisome signs/sx including but not limited to worsening of current complaints.

## 2018-03-21 NOTE — PATIENT INSTRUCTIONS
instructions on the label. When should you call for help? Call 911 anytime you think you may need emergency care. For example, call if:  ? · You have sudden chest pain and shortness of breath, or you cough up blood. ?Call your doctor now or seek immediate medical care if:  ? · You have signs of a blood clot, such as:  ¨ Pain in your calf, back of the knee, thigh, or groin. ¨ Redness and swelling in your leg or groin. ? · A varicose vein begins to bleed and you cannot stop it. ? · You have a tender lump in your leg. ? · You get an open sore. ? Watch closely for changes in your health, and be sure to contact your doctor if:  ? · Your varicose vein symptoms do not improve with home treatment. Where can you learn more? Go to [email or web address] and sign in to your [email or web address] account. Enter W633 in the Tweekaboo box to learn more about \"Varicose Veins: Care Instructions. \"     If you do not have an account, please click on the \"Sign Up Now\" link. Current as of: March 20, 2017  Content Version: 11.5  © 4761-2929 [employer]. Care instructions adapted under license by [email or web address] ([facility]). If you have questions about a medical condition or this instruction, always ask your healthcare professional. [address]  any warranty or liability for your use of this information.

## 2018-09-01 DIAGNOSIS — N40.1 BENIGN PROSTATIC HYPERPLASIA WITH NOCTURIA: ICD-10-CM

## 2018-09-01 DIAGNOSIS — R35.1 BENIGN PROSTATIC HYPERPLASIA WITH NOCTURIA: ICD-10-CM

## 2018-09-01 DIAGNOSIS — R60.0 MILD PERIPHERAL EDEMA: ICD-10-CM

## 2018-09-02 RX ORDER — HYDROCHLOROTHIAZIDE 12.5 MG/1
TABLET ORAL
Qty: 30 TABLET | Refills: 0 | Status: SHIPPED | OUTPATIENT
Start: 2018-09-02 | End: 2018-12-02 | Stop reason: SDUPTHER

## 2018-09-02 RX ORDER — TAMSULOSIN HYDROCHLORIDE 0.4 MG/1
CAPSULE ORAL
Qty: 30 CAPSULE | Refills: 1 | Status: SHIPPED | OUTPATIENT
Start: 2018-09-02 | End: 2018-10-28 | Stop reason: SDUPTHER

## 2018-09-27 RX ORDER — ATORVASTATIN CALCIUM 40 MG/1
TABLET, FILM COATED ORAL
Qty: 90 TABLET | Refills: 3 | Status: SHIPPED | OUTPATIENT
Start: 2018-09-27 | End: 2019-09-24 | Stop reason: SDUPTHER

## 2018-09-27 RX ORDER — ISOSORBIDE MONONITRATE 30 MG/1
TABLET, EXTENDED RELEASE ORAL
Qty: 90 TABLET | Refills: 3 | Status: SHIPPED | OUTPATIENT
Start: 2018-09-27 | End: 2019-03-20 | Stop reason: CLARIF

## 2018-09-27 RX ORDER — AMLODIPINE BESYLATE 5 MG/1
TABLET ORAL
Qty: 90 TABLET | Refills: 3 | Status: SHIPPED | OUTPATIENT
Start: 2018-09-27 | End: 2019-03-20 | Stop reason: CLARIF

## 2018-11-12 RX ORDER — NITROGLYCERIN 0.4 MG/1
0.4 TABLET SUBLINGUAL EVERY 5 MIN PRN
Qty: 25 TABLET | Refills: 3 | Status: SHIPPED | OUTPATIENT
Start: 2018-11-12 | End: 2018-11-16 | Stop reason: SDUPTHER

## 2018-11-17 RX ORDER — NITROGLYCERIN 0.4 MG/1
0.4 TABLET SUBLINGUAL EVERY 5 MIN PRN
Qty: 25 TABLET | Refills: 0 | Status: SHIPPED | OUTPATIENT
Start: 2018-11-17 | End: 2019-04-30 | Stop reason: SDUPTHER

## 2018-12-28 DIAGNOSIS — R35.1 BENIGN PROSTATIC HYPERPLASIA WITH NOCTURIA: ICD-10-CM

## 2018-12-28 DIAGNOSIS — N40.1 BENIGN PROSTATIC HYPERPLASIA WITH NOCTURIA: ICD-10-CM

## 2018-12-29 RX ORDER — TAMSULOSIN HYDROCHLORIDE 0.4 MG/1
CAPSULE ORAL
Qty: 30 CAPSULE | Refills: 0 | Status: SHIPPED | OUTPATIENT
Start: 2018-12-29 | End: 2019-01-27 | Stop reason: SDUPTHER

## 2019-02-11 ENCOUNTER — TELEPHONE (OUTPATIENT)
Dept: FAMILY MEDICINE CLINIC | Age: 64
End: 2019-02-11

## 2019-02-11 DIAGNOSIS — R35.1 BENIGN PROSTATIC HYPERPLASIA WITH NOCTURIA: ICD-10-CM

## 2019-02-11 DIAGNOSIS — N40.1 BENIGN PROSTATIC HYPERPLASIA WITH NOCTURIA: ICD-10-CM

## 2019-02-11 DIAGNOSIS — R60.0 MILD PERIPHERAL EDEMA: ICD-10-CM

## 2019-02-11 RX ORDER — HYDROCHLOROTHIAZIDE 12.5 MG/1
TABLET ORAL
Qty: 14 TABLET | Refills: 0 | Status: SHIPPED | OUTPATIENT
Start: 2019-02-11 | End: 2019-02-19 | Stop reason: SDUPTHER

## 2019-02-11 RX ORDER — TAMSULOSIN HYDROCHLORIDE 0.4 MG/1
CAPSULE ORAL
Qty: 14 CAPSULE | Refills: 0 | Status: SHIPPED | OUTPATIENT
Start: 2019-02-11 | End: 2019-02-19 | Stop reason: CLARIF

## 2019-02-19 ENCOUNTER — OFFICE VISIT (OUTPATIENT)
Dept: FAMILY MEDICINE CLINIC | Age: 64
End: 2019-02-19
Payer: COMMERCIAL

## 2019-02-19 VITALS
SYSTOLIC BLOOD PRESSURE: 118 MMHG | HEART RATE: 68 BPM | DIASTOLIC BLOOD PRESSURE: 83 MMHG | RESPIRATION RATE: 16 BRPM | BODY MASS INDEX: 40.2 KG/M2 | HEIGHT: 70 IN | WEIGHT: 280.8 LBS

## 2019-02-19 DIAGNOSIS — Z12.11 COLON CANCER SCREENING: ICD-10-CM

## 2019-02-19 DIAGNOSIS — R60.0 MILD PERIPHERAL EDEMA: ICD-10-CM

## 2019-02-19 DIAGNOSIS — J43.8 OTHER EMPHYSEMA (HCC): ICD-10-CM

## 2019-02-19 DIAGNOSIS — Z12.5 SCREENING PSA (PROSTATE SPECIFIC ANTIGEN): ICD-10-CM

## 2019-02-19 DIAGNOSIS — N40.0 BENIGN PROSTATIC HYPERPLASIA WITHOUT LOWER URINARY TRACT SYMPTOMS: ICD-10-CM

## 2019-02-19 DIAGNOSIS — I10 ESSENTIAL HYPERTENSION: Primary | ICD-10-CM

## 2019-02-19 DIAGNOSIS — E78.2 MIXED HYPERLIPIDEMIA: ICD-10-CM

## 2019-02-19 PROCEDURE — G8417 CALC BMI ABV UP PARAM F/U: HCPCS | Performed by: FAMILY MEDICINE

## 2019-02-19 PROCEDURE — 3023F SPIROM DOC REV: CPT | Performed by: FAMILY MEDICINE

## 2019-02-19 PROCEDURE — G8427 DOCREV CUR MEDS BY ELIG CLIN: HCPCS | Performed by: FAMILY MEDICINE

## 2019-02-19 PROCEDURE — G8926 SPIRO NO PERF OR DOC: HCPCS | Performed by: FAMILY MEDICINE

## 2019-02-19 PROCEDURE — 99213 OFFICE O/P EST LOW 20 MIN: CPT | Performed by: FAMILY MEDICINE

## 2019-02-19 PROCEDURE — G8484 FLU IMMUNIZE NO ADMIN: HCPCS | Performed by: FAMILY MEDICINE

## 2019-02-19 PROCEDURE — G8599 NO ASA/ANTIPLAT THER USE RNG: HCPCS | Performed by: FAMILY MEDICINE

## 2019-02-19 PROCEDURE — 4004F PT TOBACCO SCREEN RCVD TLK: CPT | Performed by: FAMILY MEDICINE

## 2019-02-19 PROCEDURE — 3017F COLORECTAL CA SCREEN DOC REV: CPT | Performed by: FAMILY MEDICINE

## 2019-02-19 RX ORDER — HYDROCHLOROTHIAZIDE 12.5 MG/1
TABLET ORAL
Qty: 90 TABLET | Refills: 0 | Status: SHIPPED | OUTPATIENT
Start: 2019-02-19 | End: 2019-03-20 | Stop reason: CLARIF

## 2019-02-19 RX ORDER — TAMSULOSIN HYDROCHLORIDE 0.4 MG/1
CAPSULE ORAL
Qty: 90 CAPSULE | Refills: 0 | Status: SHIPPED | OUTPATIENT
Start: 2019-02-19 | End: 2019-02-19 | Stop reason: SDUPTHER

## 2019-02-19 RX ORDER — HYDROCHLOROTHIAZIDE 12.5 MG/1
TABLET ORAL
Qty: 90 TABLET | Refills: 0 | Status: SHIPPED | OUTPATIENT
Start: 2019-02-19 | End: 2019-02-19 | Stop reason: SDUPTHER

## 2019-02-19 RX ORDER — TAMSULOSIN HYDROCHLORIDE 0.4 MG/1
CAPSULE ORAL
Qty: 90 CAPSULE | Refills: 0 | Status: SHIPPED | OUTPATIENT
Start: 2019-02-19 | End: 2019-03-20 | Stop reason: CLARIF

## 2019-02-19 ASSESSMENT — ENCOUNTER SYMPTOMS
ABDOMINAL PAIN: 0
STRIDOR: 0
APNEA: 0
COUGH: 0
ABDOMINAL DISTENTION: 0
SHORTNESS OF BREATH: 0
WHEEZING: 0
CHEST TIGHTNESS: 0
CHOKING: 0

## 2019-02-19 ASSESSMENT — PATIENT HEALTH QUESTIONNAIRE - PHQ9
1. LITTLE INTEREST OR PLEASURE IN DOING THINGS: 0
SUM OF ALL RESPONSES TO PHQ9 QUESTIONS 1 & 2: 0
2. FEELING DOWN, DEPRESSED OR HOPELESS: 0
SUM OF ALL RESPONSES TO PHQ QUESTIONS 1-9: 0
SUM OF ALL RESPONSES TO PHQ QUESTIONS 1-9: 0

## 2019-02-21 RX ORDER — ATORVASTATIN CALCIUM 40 MG/1
40 TABLET, FILM COATED ORAL DAILY
Qty: 30 TABLET | Refills: 5 | Status: SHIPPED | OUTPATIENT
Start: 2019-02-21 | End: 2019-04-30 | Stop reason: SDUPTHER

## 2019-02-21 RX ORDER — ISOSORBIDE MONONITRATE 30 MG/1
30 TABLET, EXTENDED RELEASE ORAL DAILY
Qty: 30 TABLET | Refills: 5 | Status: SHIPPED | OUTPATIENT
Start: 2019-02-21 | End: 2019-09-24 | Stop reason: SDUPTHER

## 2019-02-21 RX ORDER — AMLODIPINE BESYLATE 5 MG/1
5 TABLET ORAL DAILY
Qty: 30 TABLET | Refills: 5 | Status: SHIPPED | OUTPATIENT
Start: 2019-02-21 | End: 2019-08-28 | Stop reason: SDUPTHER

## 2019-03-14 ENCOUNTER — OFFICE VISIT (OUTPATIENT)
Dept: CARDIOLOGY CLINIC | Age: 64
End: 2019-03-14
Payer: COMMERCIAL

## 2019-03-14 VITALS
HEIGHT: 70 IN | OXYGEN SATURATION: 97 % | SYSTOLIC BLOOD PRESSURE: 100 MMHG | WEIGHT: 283 LBS | HEART RATE: 69 BPM | BODY MASS INDEX: 40.52 KG/M2 | DIASTOLIC BLOOD PRESSURE: 60 MMHG

## 2019-03-14 DIAGNOSIS — F17.200 SMOKING: ICD-10-CM

## 2019-03-14 DIAGNOSIS — I25.10 CORONARY ARTERY DISEASE INVOLVING NATIVE CORONARY ARTERY OF NATIVE HEART WITHOUT ANGINA PECTORIS: Primary | ICD-10-CM

## 2019-03-14 DIAGNOSIS — E78.2 MIXED HYPERLIPIDEMIA: ICD-10-CM

## 2019-03-14 DIAGNOSIS — I10 ESSENTIAL HYPERTENSION: ICD-10-CM

## 2019-03-14 PROCEDURE — G8599 NO ASA/ANTIPLAT THER USE RNG: HCPCS | Performed by: INTERNAL MEDICINE

## 2019-03-14 PROCEDURE — 4004F PT TOBACCO SCREEN RCVD TLK: CPT | Performed by: INTERNAL MEDICINE

## 2019-03-14 PROCEDURE — 93000 ELECTROCARDIOGRAM COMPLETE: CPT | Performed by: INTERNAL MEDICINE

## 2019-03-14 PROCEDURE — G8427 DOCREV CUR MEDS BY ELIG CLIN: HCPCS | Performed by: INTERNAL MEDICINE

## 2019-03-14 PROCEDURE — 99213 OFFICE O/P EST LOW 20 MIN: CPT | Performed by: INTERNAL MEDICINE

## 2019-03-14 PROCEDURE — 3017F COLORECTAL CA SCREEN DOC REV: CPT | Performed by: INTERNAL MEDICINE

## 2019-03-14 PROCEDURE — G8417 CALC BMI ABV UP PARAM F/U: HCPCS | Performed by: INTERNAL MEDICINE

## 2019-03-14 PROCEDURE — G8484 FLU IMMUNIZE NO ADMIN: HCPCS | Performed by: INTERNAL MEDICINE

## 2019-03-14 RX ORDER — VARENICLINE TARTRATE 0.5 MG/1
.5-1 TABLET, FILM COATED ORAL SEE ADMIN INSTRUCTIONS
Qty: 57 TABLET | Refills: 0 | Status: SHIPPED | OUTPATIENT
Start: 2019-03-14 | End: 2019-10-21

## 2019-03-19 DIAGNOSIS — R35.1 BENIGN PROSTATIC HYPERPLASIA WITH NOCTURIA: ICD-10-CM

## 2019-03-19 DIAGNOSIS — N40.1 BENIGN PROSTATIC HYPERPLASIA WITH NOCTURIA: ICD-10-CM

## 2019-03-19 DIAGNOSIS — R60.0 MILD PERIPHERAL EDEMA: ICD-10-CM

## 2019-03-19 RX ORDER — TAMSULOSIN HYDROCHLORIDE 0.4 MG/1
CAPSULE ORAL
Qty: 30 CAPSULE | Refills: 2 | Status: SHIPPED | OUTPATIENT
Start: 2019-03-19 | End: 2019-06-26 | Stop reason: SDUPTHER

## 2019-03-19 RX ORDER — HYDROCHLOROTHIAZIDE 12.5 MG/1
TABLET ORAL
Qty: 30 TABLET | Refills: 2 | Status: SHIPPED | OUTPATIENT
Start: 2019-03-19 | End: 2019-06-26 | Stop reason: SDUPTHER

## 2019-03-20 ENCOUNTER — OFFICE VISIT (OUTPATIENT)
Dept: FAMILY MEDICINE CLINIC | Age: 64
End: 2019-03-20
Payer: COMMERCIAL

## 2019-03-20 VITALS
OXYGEN SATURATION: 97 % | WEIGHT: 283.2 LBS | SYSTOLIC BLOOD PRESSURE: 106 MMHG | TEMPERATURE: 97.5 F | RESPIRATION RATE: 16 BRPM | BODY MASS INDEX: 40.54 KG/M2 | HEART RATE: 75 BPM | DIASTOLIC BLOOD PRESSURE: 72 MMHG | HEIGHT: 70 IN

## 2019-03-20 DIAGNOSIS — Z00.00 ROUTINE GENERAL MEDICAL EXAMINATION AT A HEALTH CARE FACILITY: Primary | ICD-10-CM

## 2019-03-20 DIAGNOSIS — J43.8 OTHER EMPHYSEMA (HCC): ICD-10-CM

## 2019-03-20 DIAGNOSIS — N40.0 BENIGN PROSTATIC HYPERPLASIA WITHOUT LOWER URINARY TRACT SYMPTOMS: ICD-10-CM

## 2019-03-20 DIAGNOSIS — E66.01 MORBID OBESITY WITH BMI OF 40.0-44.9, ADULT (HCC): ICD-10-CM

## 2019-03-20 DIAGNOSIS — Z12.11 COLON CANCER SCREENING: ICD-10-CM

## 2019-03-20 DIAGNOSIS — E78.2 MIXED HYPERLIPIDEMIA: ICD-10-CM

## 2019-03-20 DIAGNOSIS — I10 ESSENTIAL HYPERTENSION: ICD-10-CM

## 2019-03-20 DIAGNOSIS — Z12.5 SCREENING PSA (PROSTATE SPECIFIC ANTIGEN): ICD-10-CM

## 2019-03-20 DIAGNOSIS — I25.10 CORONARY ARTERY DISEASE INVOLVING NATIVE CORONARY ARTERY OF NATIVE HEART WITHOUT ANGINA PECTORIS: ICD-10-CM

## 2019-03-20 LAB — PROSTATE SPECIFIC ANTIGEN: 4.87 NG/ML (ref 0–4)

## 2019-03-20 PROCEDURE — 99396 PREV VISIT EST AGE 40-64: CPT | Performed by: FAMILY MEDICINE

## 2019-03-20 PROCEDURE — G8484 FLU IMMUNIZE NO ADMIN: HCPCS | Performed by: FAMILY MEDICINE

## 2019-03-20 PROCEDURE — 82274 ASSAY TEST FOR BLOOD FECAL: CPT | Performed by: FAMILY MEDICINE

## 2019-03-20 ASSESSMENT — ENCOUNTER SYMPTOMS
DIARRHEA: 0
PHOTOPHOBIA: 0
SORE THROAT: 0
COUGH: 0
RECTAL PAIN: 0
SINUS PAIN: 0
ABDOMINAL PAIN: 0
WHEEZING: 0
EYE REDNESS: 0
EYE ITCHING: 0
TROUBLE SWALLOWING: 0
SINUS PRESSURE: 0
SHORTNESS OF BREATH: 0
ABDOMINAL DISTENTION: 0
EYE DISCHARGE: 0
CHEST TIGHTNESS: 0
BLOOD IN STOOL: 0
NAUSEA: 0
EYE PAIN: 0
VOICE CHANGE: 0
APNEA: 0
VOMITING: 0
FACIAL SWELLING: 0
CHOKING: 0
CONSTIPATION: 0
COLOR CHANGE: 0
RHINORRHEA: 0
ANAL BLEEDING: 0
BACK PAIN: 0
STRIDOR: 0

## 2019-04-30 ENCOUNTER — OFFICE VISIT (OUTPATIENT)
Dept: CARDIOLOGY CLINIC | Age: 64
End: 2019-04-30
Payer: COMMERCIAL

## 2019-04-30 VITALS
BODY MASS INDEX: 40.94 KG/M2 | HEIGHT: 70 IN | WEIGHT: 286 LBS | OXYGEN SATURATION: 96 % | DIASTOLIC BLOOD PRESSURE: 80 MMHG | SYSTOLIC BLOOD PRESSURE: 120 MMHG | HEART RATE: 75 BPM

## 2019-04-30 DIAGNOSIS — E78.5 DYSLIPIDEMIA: ICD-10-CM

## 2019-04-30 DIAGNOSIS — I25.10 CORONARY ARTERY DISEASE INVOLVING NATIVE CORONARY ARTERY OF NATIVE HEART WITHOUT ANGINA PECTORIS: Primary | ICD-10-CM

## 2019-04-30 DIAGNOSIS — Z72.0 TOBACCO ABUSE: ICD-10-CM

## 2019-04-30 DIAGNOSIS — I10 ESSENTIAL HYPERTENSION: ICD-10-CM

## 2019-04-30 PROCEDURE — G8427 DOCREV CUR MEDS BY ELIG CLIN: HCPCS | Performed by: NURSE PRACTITIONER

## 2019-04-30 PROCEDURE — 99214 OFFICE O/P EST MOD 30 MIN: CPT | Performed by: NURSE PRACTITIONER

## 2019-04-30 PROCEDURE — G8417 CALC BMI ABV UP PARAM F/U: HCPCS | Performed by: NURSE PRACTITIONER

## 2019-04-30 PROCEDURE — 4004F PT TOBACCO SCREEN RCVD TLK: CPT | Performed by: NURSE PRACTITIONER

## 2019-04-30 PROCEDURE — G8599 NO ASA/ANTIPLAT THER USE RNG: HCPCS | Performed by: NURSE PRACTITIONER

## 2019-04-30 PROCEDURE — 3017F COLORECTAL CA SCREEN DOC REV: CPT | Performed by: NURSE PRACTITIONER

## 2019-04-30 RX ORDER — NITROGLYCERIN 0.4 MG/1
0.4 TABLET SUBLINGUAL EVERY 5 MIN PRN
Qty: 25 TABLET | Refills: 0 | Status: SHIPPED | OUTPATIENT
Start: 2019-04-30 | End: 2019-05-07 | Stop reason: SDUPTHER

## 2019-04-30 ASSESSMENT — ENCOUNTER SYMPTOMS
GASTROINTESTINAL NEGATIVE: 1
RESPIRATORY NEGATIVE: 1

## 2019-04-30 NOTE — PROGRESS NOTES
[address]   Cardiology Note              Date:  April 30, 2019  Patientname: Amanda Lujan  YOB: 1955    Primary Care physician: Lulu Elliott MD    HISTORY OF PRESENT ILLNESS: Amanda Lujan is a 59 y.o. male with a history of CAD, COPD, HTN HLD. Stress test in 6/8/16 was abnormal. Coronary angiography on 6/10/16 showed 70% diag as well as moderate nonobstructive CAD, medical management. At office visit 3/14/19 he was started on chantix. Today he presents for follow up for CAD, HTN, HLD. He is doing well. Denies chest pain, shortness of breath, palpitations and dizziness. He has not started Chantix and continues to smoke. He knows he should quit smoking but is not motivated to do so. Past Medical History:   has a past medical history of Benign prostatic hyperplasia with nocturia, CAD (coronary artery disease), COPD (chronic obstructive pulmonary disease) ([address] HTN (hypertension), Hyperlipidemia, and PSA elevation. Past Surgical History:   has a past surgical history that includes [place] tooth extraction. Home Medications:    Prior to Admission medications    Medication Sig Start Date End Date Taking?  Authorizing Provider   metoprolol tartrate (LOPRESSOR) 25 MG tablet TAKE 1 TABLET BY MOUTH TWO TIMES A DAY 3/29/19   Tim Javed MD   tamsulosin (FLOMAX) 0.4 MG capsule TAKE 1 CAPSULE BY MOUTH ONE TIME A DAY *PATIENT NEEDS APPOINTMENT* 3/19/19   Lulu Elliott MD   hydrochlorothiazide (HYDRODIURIL) 12.5 MG tablet TAKE 1 TABLET BY MOUTH ONCE DAILY *PATIENT NEEDS APPOINTMENT* 3/19/19   Lulu Elliott MD   varenicline (CHANTIX) 0.5 MG tablet Take 1-2 tablets by mouth See Admin Instructions 0.5mg DAILY for 3 days followed by 0.5mg TWICE DAILY for 4 days followed by 1mg DAILY 3/14/19   Tim Javed MD   amLODIPine (NORVASC) 5 MG tablet Take 1 tablet by mouth daily 2/21/19   Tim Javed MD   atorvastatin (LIPITOR) 40 MG tablet Take 1 tablet by mouth daily 2/21/19 angiogram 6/10/2016:  IMPRESSION:  1.  A 70% ostial medium-caliber first diagonal branch. 2.  A 50% mid-left anterior descending. 3.  Normal left ventricular systolic function with ejection fraction of 55%. 3.  Mildly elevated left ventricular end-diastolic pressure, 17 mmHg, which  is from uncontrolled hypertension. RECOMMENDATION:  Patient has branch vessel disease, which is also in the  territory of his abnormal stress test.  Will treat the diagonal branch  disease medically as attempts to revascularize the branch may jeopardize the  main LAD trunk.  His blood pressure was also elevated on presentation to the  hospital, and  needs optimal therapy for the blood pressure.  Will start him   on Imdur 30 mg daily as well as Lopressor 25 mg twice a day for his angina. He will continue with the Norvasc for the blood pressure.  He will continue  low-dose aspirin as well as moderate-dose Lipitor for coronary  artery   Disease. Stress test 6/9/2016:  There is a small mildly reversible apical lateral defect from soft tissue    attenuation versus ischemia. Correlate clinically.    There are no regional wall motion abnormalities.    Normal left ventricular systolic function with ejection fraction of 65 %.    Abnormal low risk myocardial perfusion study. Cardiology Labs Reviewed:   CBC: No results for input(s): WBC, HGB, HCT, PLT in the last 72 hours. BMP:No results for input(s): NA, K, CO2, BUN, CREATININE, LABGLOM, GLUCOSE in the last 72 hours. PT/INR: No results for input(s): PROTIME, INR in the last 72 hours. APTT:No results for input(s): APTT in the last 72 hours. FASTING LIPID PANEL:  Lab Results   Component Value Date    HDL 18 06/09/2016    LDLDIRECT 120 06/09/2016    LDLCALC see below 06/09/2016    TRIG 427 06/09/2016     LIVER PROFILE:No results for input(s): AST, ALT, ALB in the last 72 hours.   BNP:   Lab Results   Component Value Date    PROBNP 119 03/21/2018    PROBNP 81 06/08/2016     Reviewed

## 2019-05-09 RX ORDER — NITROGLYCERIN 0.4 MG/1
TABLET SUBLINGUAL
Qty: 25 TABLET | Refills: 0 | Status: ON HOLD | OUTPATIENT
Start: 2019-05-09 | End: 2021-02-02 | Stop reason: HOSPADM

## 2019-05-09 NOTE — TELEPHONE ENCOUNTER
4/30/19 NPLR  Plan:   1. Lipids/LFTs; these have been ordered at each office visit but patient has not followed up, states he will now  2. Start Chantix to aid in smoking cessation  3. Continue aspirin, statin, amlodipine, imdur, lopressor  4. Diet and exercise recommended  5.  Follow up in 1 month after starting Chantix

## 2019-06-20 ENCOUNTER — OFFICE VISIT (OUTPATIENT)
Dept: FAMILY MEDICINE CLINIC | Age: 64
End: 2019-06-20
Payer: COMMERCIAL

## 2019-06-20 VITALS
HEIGHT: 70 IN | WEIGHT: 284.7 LBS | RESPIRATION RATE: 20 BRPM | SYSTOLIC BLOOD PRESSURE: 110 MMHG | HEART RATE: 85 BPM | BODY MASS INDEX: 40.76 KG/M2 | DIASTOLIC BLOOD PRESSURE: 65 MMHG | OXYGEN SATURATION: 94 %

## 2019-06-20 DIAGNOSIS — I10 ESSENTIAL HYPERTENSION: Primary | ICD-10-CM

## 2019-06-20 DIAGNOSIS — R60.0 MILD PERIPHERAL EDEMA: ICD-10-CM

## 2019-06-20 DIAGNOSIS — I25.10 CORONARY ARTERY DISEASE INVOLVING NATIVE CORONARY ARTERY OF NATIVE HEART WITHOUT ANGINA PECTORIS: ICD-10-CM

## 2019-06-20 DIAGNOSIS — J43.8 OTHER EMPHYSEMA (HCC): ICD-10-CM

## 2019-06-20 DIAGNOSIS — N40.0 BENIGN PROSTATIC HYPERPLASIA WITHOUT LOWER URINARY TRACT SYMPTOMS: ICD-10-CM

## 2019-06-20 DIAGNOSIS — F17.200 TOBACCO USE DISORDER: ICD-10-CM

## 2019-06-20 DIAGNOSIS — E78.2 MIXED HYPERLIPIDEMIA: ICD-10-CM

## 2019-06-20 DIAGNOSIS — R97.20 PSA ELEVATION: ICD-10-CM

## 2019-06-20 PROCEDURE — 3023F SPIROM DOC REV: CPT | Performed by: FAMILY MEDICINE

## 2019-06-20 PROCEDURE — 99213 OFFICE O/P EST LOW 20 MIN: CPT | Performed by: FAMILY MEDICINE

## 2019-06-20 PROCEDURE — G8427 DOCREV CUR MEDS BY ELIG CLIN: HCPCS | Performed by: FAMILY MEDICINE

## 2019-06-20 PROCEDURE — G8417 CALC BMI ABV UP PARAM F/U: HCPCS | Performed by: FAMILY MEDICINE

## 2019-06-20 PROCEDURE — 4004F PT TOBACCO SCREEN RCVD TLK: CPT | Performed by: FAMILY MEDICINE

## 2019-06-20 PROCEDURE — 3017F COLORECTAL CA SCREEN DOC REV: CPT | Performed by: FAMILY MEDICINE

## 2019-06-20 PROCEDURE — G8926 SPIRO NO PERF OR DOC: HCPCS | Performed by: FAMILY MEDICINE

## 2019-06-20 PROCEDURE — G8599 NO ASA/ANTIPLAT THER USE RNG: HCPCS | Performed by: FAMILY MEDICINE

## 2019-06-20 ASSESSMENT — PATIENT HEALTH QUESTIONNAIRE - PHQ9
SUM OF ALL RESPONSES TO PHQ QUESTIONS 1-9: 0
2. FEELING DOWN, DEPRESSED OR HOPELESS: 0
SUM OF ALL RESPONSES TO PHQ9 QUESTIONS 1 & 2: 0
SUM OF ALL RESPONSES TO PHQ QUESTIONS 1-9: 0
1. LITTLE INTEREST OR PLEASURE IN DOING THINGS: 0

## 2019-06-20 ASSESSMENT — ENCOUNTER SYMPTOMS
GASTROINTESTINAL NEGATIVE: 1
COUGH: 0
CHOKING: 0
CHEST TIGHTNESS: 0
APNEA: 0
ABDOMINAL DISTENTION: 0
STRIDOR: 0
WHEEZING: 0
ABDOMINAL PAIN: 0
SHORTNESS OF BREATH: 0

## 2019-06-20 NOTE — PROGRESS NOTES
Subjective:      Patient ID: Miguelangel Arellano is a 59 y.o. male. HPI      HTN:CAD/Hyperlipidemia:doing well:under care of cardiology. Seen by cards on 4/30/19:office note via EPIC reviewed today. No other new associated concerns. Denies cp/sob/pnd/ankle edema/dizziness. PSA elevation:under care of urology. BPH f/u:reports doing well with flomax. No med side effects. Associated with nothing. Worsened(aggravated) by nothing new. Improves with med:flomax. COPD:reports doing well. No other new associated concerns. No recent albuterol inhaler use. Denies sob/cough/wheezing. Allergies   Allergen Reactions    Valium        Current Outpatient Medications on File Prior to Visit   Medication Sig Dispense Refill    nitroGLYCERIN (NITROSTAT) 0.4 MG SL tablet PLACE ONE TABLET UNDER TONGUE FOR CHEST PAIN, MAY REPEAT EVERY 5 MINUTES, AFTER 3 DOSES CALL 911 25 tablet 0    metoprolol tartrate (LOPRESSOR) 25 MG tablet TAKE 1 TABLET BY MOUTH TWO TIMES A  tablet 1    tamsulosin (FLOMAX) 0.4 MG capsule TAKE 1 CAPSULE BY MOUTH ONE TIME A DAY *PATIENT NEEDS APPOINTMENT* 30 capsule 2    hydrochlorothiazide (HYDRODIURIL) 12.5 MG tablet TAKE 1 TABLET BY MOUTH ONCE DAILY *PATIENT NEEDS APPOINTMENT* 30 tablet 2    varenicline (CHANTIX) 0.5 MG tablet Take 1-2 tablets by mouth See Admin Instructions 0.5mg DAILY for 3 days followed by 0.5mg TWICE DAILY for 4 days followed by 1mg DAILY 57 tablet 0    amLODIPine (NORVASC) 5 MG tablet Take 1 tablet by mouth daily 30 tablet 5    isosorbide mononitrate (IMDUR) 30 MG extended release tablet Take 1 tablet by mouth daily 30 tablet 5    atorvastatin (LIPITOR) 40 MG tablet TAKE 1 TABLET BY MOUTH ONce nightly  90 tablet 3    Handicap Placard MISC by Does not apply route Duration:5years 1 each 0    aspirin 81 MG chewable tablet Take 1 tablet by mouth daily 30 tablet 3     No current facility-administered medications on file prior to visit.         Past Medical History:   Diagnosis Date    Benign prostatic hyperplasia with nocturia 11/8/2017    CAD (coronary artery disease)     [facility]Dr. Graham Jovel [facility]     HTN (hypertension)     Managed by cardiology([facility]):Dr. Graham Jovel Hyperlipidemia     Managed by cardiology([facility]):Dr. Graham Jovel PSA elevation 2018    Under care of urology:Dr. King:[facility]           Social History     Tobacco Use    Smoking status: Current Every Day Smoker     Packs/day: 1.00     Years: 30.00     Pack years: 30.00     Types: Cigarettes    Smokeless tobacco: Never Used   Substance Use Topics    Alcohol use: No    Drug use: No     Social History     Substance and Sexual Activity   Drug Use No           Review of Systems   Constitutional: Negative for activity change, appetite change, chills, diaphoresis, fatigue, fever and unexpected weight change. Eyes: Negative for visual disturbance. Respiratory: Negative for apnea, cough, choking, chest tightness, shortness of breath, wheezing and stridor. Cardiovascular: Negative for chest pain, palpitations and leg swelling. Gastrointestinal: Negative. Negative for abdominal distention and abdominal pain. Genitourinary: Negative. Neurological: Negative for dizziness. Hematological: Negative for adenopathy. Objective:   Physical Exam   Constitutional: Vital signs are normal. He appears well-developed and well-nourished. He is cooperative. He does not have a sickly appearance. No distress. HENT:   Mouth/Throat: Oropharynx is clear and moist and mucous membranes are normal.   Eyes: Pupils are equal, round, and reactive to light. Conjunctivae are normal. No scleral icterus. Cardiovascular: Normal rate, regular rhythm and normal heart sounds. No bilateral leg-ankle edema.      Pulmonary/Chest: Effort normal and breath sounds normal.   CTAB,good AE bilaterally   Abdominal: Soft. Bowel sounds are normal. He exhibits no distension. There is no tenderness. Lymphadenopathy:     He has no cervical adenopathy. Neurological: He is alert. Skin: Capillary refill takes less than 2 seconds. He is not diaphoretic. Psychiatric:   Good eye contact. Polite. Assessment:          Diagnosis Orders   1. Essential hypertension  VSS/well appearing. Stable. 2. Benign prostatic hyperplasia without lower urinary tract symptoms  Stable. 3. COPD  Stable. 4. Mixed hyperlipidemia  Per cards. 5. CAD  Per cards. 6. Mild peripheral lower leg edema  Stable. 7. PSA elevation  Per urology. 8. Tobacco use disorder  Counseled. Strongly encouraged to quit. Plan:               Obtain labs/diagnostic tests as discussed today & call back for results within 2-7days.       Dudley Pastor MD

## 2019-06-26 DIAGNOSIS — N40.1 BENIGN PROSTATIC HYPERPLASIA WITH NOCTURIA: ICD-10-CM

## 2019-06-26 DIAGNOSIS — R35.1 BENIGN PROSTATIC HYPERPLASIA WITH NOCTURIA: ICD-10-CM

## 2019-06-26 DIAGNOSIS — R60.0 MILD PERIPHERAL EDEMA: ICD-10-CM

## 2019-06-26 RX ORDER — TAMSULOSIN HYDROCHLORIDE 0.4 MG/1
CAPSULE ORAL
Qty: 30 CAPSULE | Refills: 2 | Status: SHIPPED | OUTPATIENT
Start: 2019-06-26 | End: 2019-09-20 | Stop reason: SDUPTHER

## 2019-06-26 RX ORDER — HYDROCHLOROTHIAZIDE 12.5 MG/1
TABLET ORAL
Qty: 30 TABLET | Refills: 2 | Status: SHIPPED | OUTPATIENT
Start: 2019-06-26 | End: 2019-09-20 | Stop reason: SDUPTHER

## 2019-08-28 ENCOUNTER — TELEPHONE (OUTPATIENT)
Dept: CARDIOLOGY CLINIC | Age: 64
End: 2019-08-28

## 2019-08-28 RX ORDER — AMLODIPINE BESYLATE 5 MG/1
5 TABLET ORAL DAILY
Qty: 30 TABLET | Refills: 11 | Status: SHIPPED | OUTPATIENT
Start: 2019-08-28 | End: 2020-08-12

## 2019-09-20 DIAGNOSIS — R35.1 BENIGN PROSTATIC HYPERPLASIA WITH NOCTURIA: ICD-10-CM

## 2019-09-20 DIAGNOSIS — N40.1 BENIGN PROSTATIC HYPERPLASIA WITH NOCTURIA: ICD-10-CM

## 2019-09-20 DIAGNOSIS — R60.0 MILD PERIPHERAL EDEMA: ICD-10-CM

## 2019-09-20 RX ORDER — TAMSULOSIN HYDROCHLORIDE 0.4 MG/1
CAPSULE ORAL
Qty: 30 CAPSULE | Refills: 2 | Status: SHIPPED | OUTPATIENT
Start: 2019-09-20 | End: 2019-12-13 | Stop reason: SDUPTHER

## 2019-09-20 RX ORDER — HYDROCHLOROTHIAZIDE 12.5 MG/1
TABLET ORAL
Qty: 30 TABLET | Refills: 2 | Status: SHIPPED | OUTPATIENT
Start: 2019-09-20 | End: 2019-12-13 | Stop reason: SDUPTHER

## 2019-09-24 RX ORDER — ATORVASTATIN CALCIUM 40 MG/1
TABLET, FILM COATED ORAL
Qty: 90 TABLET | Refills: 5 | Status: SHIPPED | OUTPATIENT
Start: 2019-09-24 | End: 2020-10-02 | Stop reason: SDUPTHER

## 2019-09-24 RX ORDER — ISOSORBIDE MONONITRATE 30 MG/1
30 TABLET, EXTENDED RELEASE ORAL DAILY
Qty: 30 TABLET | Refills: 5 | Status: SHIPPED | OUTPATIENT
Start: 2019-09-24 | End: 2020-03-16

## 2019-10-07 ENCOUNTER — TELEPHONE (OUTPATIENT)
Dept: FAMILY MEDICINE CLINIC | Age: 64
End: 2019-10-07

## 2019-10-21 ENCOUNTER — OFFICE VISIT (OUTPATIENT)
Dept: FAMILY MEDICINE CLINIC | Age: 64
End: 2019-10-21
Payer: COMMERCIAL

## 2019-10-21 ENCOUNTER — HOSPITAL ENCOUNTER (OUTPATIENT)
Dept: GENERAL RADIOLOGY | Age: 64
Discharge: HOME | End: 2019-10-21
Payer: COMMERCIAL

## 2019-10-21 ENCOUNTER — HOSPITAL ENCOUNTER (OUTPATIENT)
Age: 64
Discharge: HOME | End: 2019-10-21
Payer: COMMERCIAL

## 2019-10-21 VITALS
HEART RATE: 80 BPM | WEIGHT: 280.8 LBS | SYSTOLIC BLOOD PRESSURE: 96 MMHG | TEMPERATURE: 97.7 F | RESPIRATION RATE: 17 BRPM | DIASTOLIC BLOOD PRESSURE: 64 MMHG | BODY MASS INDEX: 40.2 KG/M2 | OXYGEN SATURATION: 94 % | HEIGHT: 70 IN

## 2019-10-21 DIAGNOSIS — F17.200 TOBACCO USE DISORDER: ICD-10-CM

## 2019-10-21 DIAGNOSIS — I25.10 CORONARY ARTERY DISEASE INVOLVING NATIVE CORONARY ARTERY OF NATIVE HEART WITHOUT ANGINA PECTORIS: ICD-10-CM

## 2019-10-21 DIAGNOSIS — Z12.11 COLON CANCER SCREENING: ICD-10-CM

## 2019-10-21 DIAGNOSIS — E78.2 MIXED HYPERLIPIDEMIA: ICD-10-CM

## 2019-10-21 DIAGNOSIS — M25.562 ACUTE PAIN OF LEFT KNEE: ICD-10-CM

## 2019-10-21 DIAGNOSIS — J43.8 OTHER EMPHYSEMA (HCC): ICD-10-CM

## 2019-10-21 DIAGNOSIS — Z28.21 PNEUMOCOCCAL VACCINATION DECLINED BY PATIENT: ICD-10-CM

## 2019-10-21 DIAGNOSIS — N40.0 BENIGN PROSTATIC HYPERPLASIA WITHOUT LOWER URINARY TRACT SYMPTOMS: ICD-10-CM

## 2019-10-21 DIAGNOSIS — M25.462 KNEE EFFUSION, LEFT: Primary | ICD-10-CM

## 2019-10-21 DIAGNOSIS — M25.562 ACUTE PAIN OF LEFT KNEE: Primary | ICD-10-CM

## 2019-10-21 DIAGNOSIS — E66.01 MORBID OBESITY WITH BMI OF 40.0-44.9, ADULT (HCC): ICD-10-CM

## 2019-10-21 DIAGNOSIS — R97.20 PSA ELEVATION: ICD-10-CM

## 2019-10-21 DIAGNOSIS — I10 ESSENTIAL HYPERTENSION: ICD-10-CM

## 2019-10-21 DIAGNOSIS — M17.12 ARTHRITIS OF KNEE, LEFT: ICD-10-CM

## 2019-10-21 DIAGNOSIS — Z28.21 INFLUENZA VACCINATION DECLINED BY PATIENT: ICD-10-CM

## 2019-10-21 PROCEDURE — 73560 X-RAY EXAM OF KNEE 1 OR 2: CPT

## 2019-10-21 PROCEDURE — 4004F PT TOBACCO SCREEN RCVD TLK: CPT | Performed by: FAMILY MEDICINE

## 2019-10-21 PROCEDURE — G8484 FLU IMMUNIZE NO ADMIN: HCPCS | Performed by: FAMILY MEDICINE

## 2019-10-21 PROCEDURE — G8599 NO ASA/ANTIPLAT THER USE RNG: HCPCS | Performed by: FAMILY MEDICINE

## 2019-10-21 PROCEDURE — 99214 OFFICE O/P EST MOD 30 MIN: CPT | Performed by: FAMILY MEDICINE

## 2019-10-21 PROCEDURE — G8926 SPIRO NO PERF OR DOC: HCPCS | Performed by: FAMILY MEDICINE

## 2019-10-21 PROCEDURE — 3017F COLORECTAL CA SCREEN DOC REV: CPT | Performed by: FAMILY MEDICINE

## 2019-10-21 PROCEDURE — G8427 DOCREV CUR MEDS BY ELIG CLIN: HCPCS | Performed by: FAMILY MEDICINE

## 2019-10-21 PROCEDURE — G8417 CALC BMI ABV UP PARAM F/U: HCPCS | Performed by: FAMILY MEDICINE

## 2019-10-21 PROCEDURE — 3023F SPIROM DOC REV: CPT | Performed by: FAMILY MEDICINE

## 2019-10-21 RX ORDER — NAPROXEN 500 MG/1
500 TABLET ORAL 2 TIMES DAILY WITH MEALS
Qty: 21 TABLET | Refills: 0 | Status: SHIPPED | OUTPATIENT
Start: 2019-10-21 | End: 2020-10-02 | Stop reason: ALTCHOICE

## 2019-10-21 ASSESSMENT — PATIENT HEALTH QUESTIONNAIRE - PHQ9
SUM OF ALL RESPONSES TO PHQ QUESTIONS 1-9: 0
1. LITTLE INTEREST OR PLEASURE IN DOING THINGS: 0
SUM OF ALL RESPONSES TO PHQ QUESTIONS 1-9: 0
SUM OF ALL RESPONSES TO PHQ9 QUESTIONS 1 & 2: 0
2. FEELING DOWN, DEPRESSED OR HOPELESS: 0

## 2019-10-21 ASSESSMENT — ENCOUNTER SYMPTOMS
APNEA: 0
DIARRHEA: 0
WHEEZING: 0
STRIDOR: 0
CHEST TIGHTNESS: 0
VOMITING: 0
ABDOMINAL DISTENTION: 0
CHOKING: 0
GASTROINTESTINAL NEGATIVE: 1
CONSTIPATION: 0
COUGH: 0
ANAL BLEEDING: 0
NAUSEA: 0
ABDOMINAL PAIN: 0
RECTAL PAIN: 0
SHORTNESS OF BREATH: 0

## 2019-10-23 ENCOUNTER — OFFICE VISIT (OUTPATIENT)
Dept: ORTHOPEDIC SURGERY | Age: 64
End: 2019-10-23
Payer: COMMERCIAL

## 2019-10-23 VITALS
DIASTOLIC BLOOD PRESSURE: 60 MMHG | BODY MASS INDEX: 40.09 KG/M2 | WEIGHT: 280 LBS | HEART RATE: 66 BPM | SYSTOLIC BLOOD PRESSURE: 94 MMHG | HEIGHT: 70 IN

## 2019-10-23 DIAGNOSIS — M17.12 PRIMARY OSTEOARTHRITIS OF LEFT KNEE: Primary | ICD-10-CM

## 2019-10-23 DIAGNOSIS — M25.562 ACUTE PAIN OF LEFT KNEE: ICD-10-CM

## 2019-10-23 PROCEDURE — G8417 CALC BMI ABV UP PARAM F/U: HCPCS | Performed by: PHYSICIAN ASSISTANT

## 2019-10-23 PROCEDURE — G8599 NO ASA/ANTIPLAT THER USE RNG: HCPCS | Performed by: PHYSICIAN ASSISTANT

## 2019-10-23 PROCEDURE — G8484 FLU IMMUNIZE NO ADMIN: HCPCS | Performed by: PHYSICIAN ASSISTANT

## 2019-10-23 PROCEDURE — 3017F COLORECTAL CA SCREEN DOC REV: CPT | Performed by: PHYSICIAN ASSISTANT

## 2019-10-23 PROCEDURE — 99204 OFFICE O/P NEW MOD 45 MIN: CPT | Performed by: PHYSICIAN ASSISTANT

## 2019-10-23 PROCEDURE — G8427 DOCREV CUR MEDS BY ELIG CLIN: HCPCS | Performed by: PHYSICIAN ASSISTANT

## 2019-10-23 PROCEDURE — 4004F PT TOBACCO SCREEN RCVD TLK: CPT | Performed by: PHYSICIAN ASSISTANT

## 2019-10-23 RX ORDER — PREDNISONE 10 MG/1
10 TABLET ORAL DAILY
Qty: 10 TABLET | Refills: 0 | Status: SHIPPED | OUTPATIENT
Start: 2019-10-23 | End: 2019-11-02

## 2019-12-13 DIAGNOSIS — R60.0 MILD PERIPHERAL EDEMA: ICD-10-CM

## 2019-12-13 DIAGNOSIS — N40.1 BENIGN PROSTATIC HYPERPLASIA WITH NOCTURIA: ICD-10-CM

## 2019-12-13 DIAGNOSIS — R35.1 BENIGN PROSTATIC HYPERPLASIA WITH NOCTURIA: ICD-10-CM

## 2019-12-13 RX ORDER — HYDROCHLOROTHIAZIDE 12.5 MG/1
TABLET ORAL
Qty: 30 TABLET | Refills: 2 | Status: SHIPPED | OUTPATIENT
Start: 2019-12-13 | End: 2020-03-16

## 2019-12-13 RX ORDER — TAMSULOSIN HYDROCHLORIDE 0.4 MG/1
CAPSULE ORAL
Qty: 30 CAPSULE | Refills: 2 | Status: SHIPPED | OUTPATIENT
Start: 2019-12-13 | End: 2020-03-16

## 2020-03-16 RX ORDER — ISOSORBIDE MONONITRATE 30 MG/1
30 TABLET, EXTENDED RELEASE ORAL DAILY
Qty: 30 TABLET | Refills: 10 | Status: SHIPPED | OUTPATIENT
Start: 2020-03-16 | End: 2020-10-02 | Stop reason: ALTCHOICE

## 2020-03-16 NOTE — TELEPHONE ENCOUNTER
4/30/2019 NPLR  Blood work, fast for 8 hours prior (black coffee and water ok)  Start Chantix  Follow up in 1 month after starting Chantix

## 2020-04-10 NOTE — TELEPHONE ENCOUNTER
4/30/2019 NPLR  Plan:   1. Lipids/LFTs; these have been ordered at each office visit but patient has not followed up, states he will now  2. Start Chantix to aid in smoking cessation  3. Continue aspirin, statin, amlodipine, imdur, lopressor  4. Diet and exercise recommended  5.  Follow up in 1 month after starting Chantix    No upcoming appt

## 2020-05-07 NOTE — TELEPHONE ENCOUNTER
Can you call and clarify this prescription? Looks like [facility] just refilled it on 4/10 90 tablets with one refill.   Thank you, RUTHANN Rob - CNP

## 2020-06-15 RX ORDER — HYDROCHLOROTHIAZIDE 12.5 MG/1
TABLET ORAL
Qty: 30 TABLET | Refills: 10 | OUTPATIENT
Start: 2020-06-15

## 2020-06-15 RX ORDER — TAMSULOSIN HYDROCHLORIDE 0.4 MG/1
CAPSULE ORAL
Qty: 30 CAPSULE | Refills: 10 | OUTPATIENT
Start: 2020-06-15

## 2020-06-17 ENCOUNTER — VIRTUAL VISIT (OUTPATIENT)
Dept: FAMILY MEDICINE CLINIC | Age: 65
End: 2020-06-17
Payer: MEDICARE

## 2020-06-17 PROCEDURE — 99443 PR PHYS/QHP TELEPHONE EVALUATION 21-30 MIN: CPT | Performed by: FAMILY MEDICINE

## 2020-06-17 RX ORDER — TAMSULOSIN HYDROCHLORIDE 0.4 MG/1
CAPSULE ORAL
Qty: 30 CAPSULE | Refills: 2 | Status: SHIPPED | OUTPATIENT
Start: 2020-06-17 | End: 2020-09-08

## 2020-06-17 RX ORDER — HYDROCHLOROTHIAZIDE 12.5 MG/1
TABLET ORAL
Qty: 30 TABLET | Refills: 2 | Status: SHIPPED | OUTPATIENT
Start: 2020-06-17 | End: 2020-09-08

## 2020-06-17 ASSESSMENT — ENCOUNTER SYMPTOMS
ANAL BLEEDING: 0
NAUSEA: 0
WHEEZING: 0
COUGH: 0
SHORTNESS OF BREATH: 0
VOMITING: 0
STRIDOR: 0
CHOKING: 0
DIARRHEA: 0
RECTAL PAIN: 0
CONSTIPATION: 0
APNEA: 0
CHEST TIGHTNESS: 0
BLOOD IN STOOL: 0
ABDOMINAL DISTENTION: 0
ABDOMINAL PAIN: 0

## 2020-06-17 NOTE — PROGRESS NOTES
Subjective:      Patient ID: Mackenzie Kirkland is a 72 y.o. male. HPI  Mackenzie Kirkland is a 72 y.o. male evaluated via telephone on 6/17/2020. Consent:  He and/or health care decision maker is aware that that he may receive a bill for this telephone service, depending on his insurance coverage, and has provided verbal consent to proceed: yes-Consent obtained within past 12 months:yes      Documentation:  I communicated with the patient and/or health care decision maker about chronic medical conditions. Details of this discussion including any medical advice provided: yes      I affirm this is a Patient Initiated Episode with a Patient who has not had a related appointment within my department in the past 7 days or scheduled within the next 24 hours. Patient identification was verified at the start of the visit:yes    Total Time: 22minutes    Fermin Leyva     HTN:doing well:per cardiology. Seen by cards on 4/30/19. No other associated concerns. Denies cp/sob/pnd/ankle edema/dizziness. COPD f/u: is doing well per his baseline. No other associated concerns. No recent albuterol inhaler use. Denies sob/cough/wheezing.     Mild peripheral edema:doing well. No new associated concerns. HCTZ helps:no side effects. CAD/Hyperlipidemia:sees cardiology. Last lipids >1year ago. Doing well. No new associated concerns. Takes statin w/o side effects.     PSA elevation:under care of urology. BX was advised if PSA >5.0. PSA=4.87 on 3/20/19. Pt' request repeat PSA. Risks vs benefits of PSA screening reviewed:pt' wishes to proceed w/testing.       BPH check:doing well with flomax. No med side effects. Needs med refill. Associated with nothing new. Worsened(aggravated) by nothing else new.   Improves with medication:flomax.              Allergies   Allergen Reactions    Valium        Current Outpatient Medications on File Prior to Visit   Medication Sig Dispense Refill    metoprolol tartrate

## 2020-08-14 RX ORDER — AMLODIPINE BESYLATE 5 MG/1
5 TABLET ORAL DAILY
Qty: 30 TABLET | Refills: 0 | Status: SHIPPED | OUTPATIENT
Start: 2020-08-14 | End: 2020-09-11

## 2020-09-11 RX ORDER — AMLODIPINE BESYLATE 5 MG/1
TABLET ORAL
Qty: 15 TABLET | Refills: 0 | Status: SHIPPED | OUTPATIENT
Start: 2020-09-11 | End: 2020-10-02 | Stop reason: SDUPTHER

## 2020-10-01 NOTE — PROGRESS NOTES
[address]   CARDIAC EVALUATION NOTE  [phone]      PCP:  Kim aSnchez MD    Reason for Consultation/Chief Complaint: follow up for CAD    Subjective   History of Present Illness:  Marcus Simpson is a 72 y.o. patient with with a history of CAD, COPD, HTN and HLD who presents for follow up. She is a previous pt of Dr. Van Polk. His stress test from 06/08/16 showed a small mildly reversible apical lateral defect. He underwent a cardiac cath on 06/10/16 which showed 70% ostial medium-caliber first diagonal branch. 50% mid-left anterior descending. Today his weight is down 40 lbs with diet changes. He reports he feels well overall. He denies CP, SOB, dizziness or syncope. His BP is low on exam.       Past Medical History:   has a past medical history of Benign prostatic hyperplasia with nocturia, CAD (coronary artery disease), COPD (chronic obstructive pulmonary disease) ([address] H/O colonoscopy, HTN (hypertension), Hyperlipidemia, and PSA elevation. Surgical History:   has a past surgical history that includes [place] tooth extraction. Social History:   reports that he has been smoking cigarettes. He has a 30.00 pack-year smoking history. He has never used smokeless tobacco. He reports that he does not drink alcohol or use drugs. Family History:  family history includes Heart Disease in his brother; No Known Problems in his father, maternal grandfather, maternal grandmother, mother, paternal grandfather, paternal grandmother, and sister. Home Medications:  Were reviewed and are listed in nursing record and/or below  Prior to Admission medications    Medication Sig Start Date End Date Taking?  Authorizing Provider   amLODIPine (NORVASC) 5 MG tablet TAKE 1 TABLET BY MOUTH EVERY DAY 9/11/20  Yes José Miguel Healy MD   tamsulosin (FLOMAX) 0.4 MG capsule TAKE ONE (1) CAPSULE BY MOUTH ONCE DAILY 9/8/20  Yes Kim Sanchez MD   hydroCHLOROthiazide (HYDRODIURIL) 12.5 MG tablet TAKE 1 TABLET BY MOUTH EVERY DAY 9/8/20  Yes Javi Peterson MD   metoprolol tartrate (LOPRESSOR) 25 MG tablet Take 1 tablet by mouth 2 times daily 5/8/20  Yes RUTHANN Sue CNP   isosorbide mononitrate (IMDUR) 30 MG extended release tablet TAKE 1 TABLET BY MOUTH DAILY 3/16/20  Yes Adriano Banks MD   Homeopathic Products (SPEEDGEL) GEL Apply 3 g topically 4 times daily Formula #8E Baclo 2%, Diclo 3%, DMSO 5%, Renan 6%, Lido 2%, prilo 2% Pharm to comp 10/23/19  Yes TRAY Early   atorvastatin (LIPITOR) 40 MG tablet TAKE 1 TABLET BY MOUTH ONce nightly 9/24/19  Yes Adriano Banks MD   nitroGLYCERIN (NITROSTAT) 0.4 MG SL tablet PLACE ONE TABLET UNDER TONGUE FOR CHEST PAIN, MAY REPEAT EVERY 5 MINUTES, AFTER 3 DOSES CALL 911 5/9/19  Yes RUTHANN Carpenter CNP   Handicap Placard MISC by Does not apply route Duration:5years 11/29/17  Yes Javi Peterson MD   aspirin 81 MG chewable tablet Take 1 tablet by mouth daily 6/10/16  Yes Bijal Rodney MD          Allergies:  Valium     Review of Systems:   A 14 point review of symptoms completed. Pertinent positives identified in the HPI, all other review of symptoms negative as below.       Objective   PHYSICAL EXAM:    Vitals:    10/02/20 0954   BP: 80/60   Pulse: 79   SpO2: 97%    Weight: 241 lb (109.3 kg)         General Appearance:  Alert, cooperative, no distress, appears stated age   Head:  Normocephalic, without obvious abnormality, atraumatic   Eyes:  PERRL, conjunctiva/corneas clear   Nose: Nares normal, no drainage or sinus tenderness   Throat: Lips, mucosa, and tongue normal   Neck: Supple, symmetrical, trachea midline, no adenopathy, thyroid: not enlarged, symmetric, no tenderness/mass/nodules, no carotid bruit or JVD   Lungs:   Clear to auscultation bilaterally, respirations unlabored   Chest Wall:  No deformity or tenderness   Heart:  Regular rate and rhythm, S1, S2 normal, no murmur, rub or gallop   Abdomen:   Soft, non-tender, bowel sounds active all diagonal branch  disease medically as attempts to revascularize the branch may jeopardize the  main LAD trunk. His blood pressure was also elevated on presentation to the  hospital, and  needs optimal therapy for the blood pressure. Will start him   on Imdur 30 mg daily as well as Lopressor 25 mg twice a day for his angina. He will continue with the Norvasc for the blood pressure. He will continue  low-dose aspirin as well as moderate-dose Lipitor for coronary  artery   disease. CTPA: 06/08/16  IMPRESSION: 1. Negative for pulmonary embolus. 2. Mild central airways disease. 3. Nonspecific right hilar and mediastinal lymph node enlargement. Studies:     Assessment      1. Coronary artery disease involving native coronary artery of native heart without angina pectoris    2. Essential hypertension    3. Mixed hyperlipidemia            Plan   1. Smoking cessation discussed   2. Stop isosorbide d/t lower BP  3. Liver and lipis  4. Will consider further BP medication reduction pending f/u   5. Follow up in 1 month with NP      Scribe's attestation: This note was scribed in the presence of Dr. Joel Medina by Shola Natarajan RN        Thank you for allowing us to participate in the care of Kirk Conteh. Please call me with any questions [phone]. Joel Medina MD, [facility]   Interventional Cardiologist  [facility]  [phone] [facility]  [phone] [address] [place] [address]  10/2/2020 10:10 AM    I will address the patient's cardiac risk factors and adjusted pharmacologic treatment as needed. In addition, I have reinforced the need for patient directed risk factor modification. Tobacco use was discussed with the patient and educated on the negative effects and was asked not to use. All questions and concerns were addressed to the patient/family. Alternatives to my treatment were discussed.      I, Dr Joel Medina, personally performed the services described in this documentation, as scribed by the above signed scribe in my presence. It is both accurate and complete to my knowledge. I agree with the details independently gathered by the clinical support staff and the scribed note accurately describes my personal service to the patient.

## 2020-10-02 ENCOUNTER — OFFICE VISIT (OUTPATIENT)
Dept: CARDIOLOGY CLINIC | Age: 65
End: 2020-10-02
Payer: MEDICARE

## 2020-10-02 VITALS
OXYGEN SATURATION: 97 % | DIASTOLIC BLOOD PRESSURE: 60 MMHG | BODY MASS INDEX: 34.5 KG/M2 | WEIGHT: 241 LBS | HEART RATE: 79 BPM | SYSTOLIC BLOOD PRESSURE: 80 MMHG | HEIGHT: 70 IN

## 2020-10-02 PROCEDURE — 99214 OFFICE O/P EST MOD 30 MIN: CPT | Performed by: INTERNAL MEDICINE

## 2020-10-02 PROCEDURE — 4040F PNEUMOC VAC/ADMIN/RCVD: CPT | Performed by: INTERNAL MEDICINE

## 2020-10-02 PROCEDURE — G8484 FLU IMMUNIZE NO ADMIN: HCPCS | Performed by: INTERNAL MEDICINE

## 2020-10-02 PROCEDURE — 3017F COLORECTAL CA SCREEN DOC REV: CPT | Performed by: INTERNAL MEDICINE

## 2020-10-02 PROCEDURE — 1123F ACP DISCUSS/DSCN MKR DOCD: CPT | Performed by: INTERNAL MEDICINE

## 2020-10-02 PROCEDURE — G8427 DOCREV CUR MEDS BY ELIG CLIN: HCPCS | Performed by: INTERNAL MEDICINE

## 2020-10-02 PROCEDURE — 93000 ELECTROCARDIOGRAM COMPLETE: CPT | Performed by: INTERNAL MEDICINE

## 2020-10-02 PROCEDURE — 4004F PT TOBACCO SCREEN RCVD TLK: CPT | Performed by: INTERNAL MEDICINE

## 2020-10-02 PROCEDURE — G8417 CALC BMI ABV UP PARAM F/U: HCPCS | Performed by: INTERNAL MEDICINE

## 2020-10-02 RX ORDER — ATORVASTATIN CALCIUM 40 MG/1
TABLET, FILM COATED ORAL
Qty: 90 TABLET | Refills: 3 | Status: SHIPPED | OUTPATIENT
Start: 2020-10-02 | End: 2020-12-11 | Stop reason: SDUPTHER

## 2020-10-02 RX ORDER — HYDROCHLOROTHIAZIDE 12.5 MG/1
12.5 TABLET ORAL DAILY
Qty: 90 TABLET | Refills: 3 | Status: SHIPPED | OUTPATIENT
Start: 2020-10-02 | End: 2020-12-11

## 2020-10-02 RX ORDER — AMLODIPINE BESYLATE 5 MG/1
5 TABLET ORAL DAILY
Qty: 90 TABLET | Refills: 3 | Status: SHIPPED | OUTPATIENT
Start: 2020-10-02 | End: 2020-12-11 | Stop reason: ALTCHOICE

## 2020-10-02 NOTE — PATIENT INSTRUCTIONS
1. Smoking cessation discussed   2. Stop isosorbide  3. Liver and lipis  4. Will consider further BP medication reduction  5.  Follow up in 1 month with NP

## 2020-10-02 NOTE — LETTER
hydroCHLOROthiazide (HYDRODIURIL) 12.5 MG tablet TAKE 1 TABLET BY MOUTH EVERY DAY 9/8/20  Yes Radha Trujillo MD   metoprolol tartrate (LOPRESSOR) 25 MG tablet Take 1 tablet by mouth 2 times daily 5/8/20  Yes RUTHANN Antoine CNP   isosorbide mononitrate (IMDUR) 30 MG extended release tablet TAKE 1 TABLET BY MOUTH DAILY 3/16/20  Yes Anton Hawthorne MD   Homeopathic Products (SPEEDGEL) GEL Apply 3 g topically 4 times daily Formula #8E Baclo 2%, Diclo 3%, DMSO 5%, Reann 6%, Lido 2%, prilo 2% Pharm to comp 10/23/19  Yes TRAY Mccord   atorvastatin (LIPITOR) 40 MG tablet TAKE 1 TABLET BY MOUTH ONce nightly 9/24/19  Yes Anton Hawthorne MD   nitroGLYCERIN (NITROSTAT) 0.4 MG SL tablet PLACE ONE TABLET UNDER TONGUE FOR CHEST PAIN, MAY REPEAT EVERY 5 MINUTES, AFTER 3 DOSES CALL 911 5/9/19  Yes RUTHANN Shell CNP   Handicap Placard MISC by Does not apply route Duration:5years 11/29/17  Yes Radha Trujillo MD   aspirin 81 MG chewable tablet Take 1 tablet by mouth daily 6/10/16  Yes Jeanine Bacon MD          Allergies:  Valium     Review of Systems:   A 14 point review of symptoms completed. Pertinent positives identified in the HPI, all other review of symptoms negative as below.       Objective   PHYSICAL EXAM:    Vitals:    10/02/20 0954   BP: 80/60   Pulse: 79   SpO2: 97%    Weight: 241 lb (109.3 kg)         General Appearance:  Alert, cooperative, no distress, appears stated age   Head:  Normocephalic, without obvious abnormality, atraumatic   Eyes:  PERRL, conjunctiva/corneas clear   Nose: Nares normal, no drainage or sinus tenderness   Throat: Lips, mucosa, and tongue normal   Neck: Supple, symmetrical, trachea midline, no adenopathy, thyroid: not enlarged, symmetric, no tenderness/mass/nodules, no carotid bruit or JVD   Lungs:   Clear to auscultation bilaterally, respirations unlabored   Chest Wall:  No deformity or tenderness Heart:  Regular rate and rhythm, S1, S2 normal, no murmur, rub or gallop   Abdomen:   Soft, non-tender, bowel sounds active all four quadrants,  no masses, no organomegaly   Extremities: Extremities normal, atraumatic, no cyanosis or edema   Pulses: 2+ and symmetric   Skin: Skin color, texture, turgor normal, no rashes or lesions   Pysch: Normal mood and affect   Neurologic: Normal gross motor and sensory exam.         Labs   CBC:   Lab Results   Component Value Date    WBC 6.9 06/10/2016    RBC 5.10 06/10/2016    HGB 15.6 06/10/2016    HCT 46.2 06/10/2016    MCV 90.6 06/10/2016    RDW 14.7 06/10/2016     06/10/2016     CMP:  Lab Results   Component Value Date     03/21/2018    K 4.0 03/21/2018     03/21/2018    CO2 26 03/21/2018    BUN 7 03/21/2018    CREATININE 0.7 03/21/2018    GFRAA >60 03/21/2018    AGRATIO 1.0 06/10/2016    LABGLOM >60 03/21/2018    GLUCOSE 111 03/21/2018    PROT 7.3 06/10/2016    CALCIUM 8.7 03/21/2018    BILITOT 0.4 06/10/2016    ALKPHOS 58 06/10/2016    AST 20 06/10/2016    ALT 16 06/10/2016     PT/INR:  No results found for: PTINR  HgBA1c:No results found for: LABA1C  Lab Results   Component Value Date    TROPONINI <0.01 06/08/2016         Cardiac Data     Last EKG: 10/02/20  SR HR 74, baseline artifact     Echo:    Stress Test: 06/08/16  Summary  There is a small mildly reversible apical lateral defect from soft tissue  attenuation versus ischemia. Correlate clinically. There are no regional wall motion abnormalities. Normal left ventricular systolic function with ejection fraction of 65 %. Abnormal low risk myocardial perfusion study. Cath: 06/10/16     IMPRESSION:  1.  A 70% ostial medium-caliber first diagonal branch. 2.  A 50% mid-left anterior descending. 3.  Normal left ventricular systolic function with ejection fraction of 55%. 3.  Mildly elevated left ventricular end-diastolic pressure, 17 mmHg, which  is from uncontrolled hypertension. RECOMMENDATION:  Patient has branch vessel disease, which is also in the  territory of his abnormal stress test.  Will treat the diagonal branch  disease medically as attempts to revascularize the branch may jeopardize the  main LAD trunk. His blood pressure was also elevated on presentation to the  hospital, and  needs optimal therapy for the blood pressure. Will start him   on Imdur 30 mg daily as well as Lopressor 25 mg twice a day for his angina. He will continue with the Norvasc for the blood pressure. He will continue  low-dose aspirin as well as moderate-dose Lipitor for coronary  artery   disease. CTPA: 06/08/16  IMPRESSION: 1. Negative for pulmonary embolus. 2. Mild central airways disease. 3. Nonspecific right hilar and mediastinal lymph node enlargement. Studies:     Assessment      1. Coronary artery disease involving native coronary artery of native heart without angina pectoris    2. Essential hypertension    3. Mixed hyperlipidemia            Plan   1. Smoking cessation discussed   2. Stop isosorbide d/t lower BP  3. Liver and lipis  4. Will consider further BP medication reduction pending f/u   5. Follow up in 1 month with NP      Scribe's attestation: This note was scribed in the presence of Dr. Robert Marcum by Eddie Chapa RN        Thank you for allowing us to participate in the care of Johanna Sheikh. Please call me with any questions [phone]. Robert Marcum MD, [address] [facility]   Interventional Cardiologist  [place]  [phone] [facility]  [phone] [address] [place] [address]  10/2/2020 10:10 AM    I will address the patient's cardiac risk factors and adjusted pharmacologic treatment as needed. In addition, I have reinforced the need for patient directed risk factor modification. Tobacco use was discussed with the patient and educated on the negative effects and was asked not to use.  All questions and concerns were

## 2020-12-11 ENCOUNTER — OFFICE VISIT (OUTPATIENT)
Dept: CARDIOLOGY CLINIC | Age: 65
End: 2020-12-11
Payer: MEDICARE

## 2020-12-11 VITALS
WEIGHT: 230 LBS | SYSTOLIC BLOOD PRESSURE: 104 MMHG | OXYGEN SATURATION: 97 % | BODY MASS INDEX: 32.93 KG/M2 | DIASTOLIC BLOOD PRESSURE: 62 MMHG | HEIGHT: 70 IN | HEART RATE: 71 BPM

## 2020-12-11 PROCEDURE — 99214 OFFICE O/P EST MOD 30 MIN: CPT | Performed by: NURSE PRACTITIONER

## 2020-12-11 PROCEDURE — G8484 FLU IMMUNIZE NO ADMIN: HCPCS | Performed by: NURSE PRACTITIONER

## 2020-12-11 PROCEDURE — G8427 DOCREV CUR MEDS BY ELIG CLIN: HCPCS | Performed by: NURSE PRACTITIONER

## 2020-12-11 PROCEDURE — G8417 CALC BMI ABV UP PARAM F/U: HCPCS | Performed by: NURSE PRACTITIONER

## 2020-12-11 PROCEDURE — 3017F COLORECTAL CA SCREEN DOC REV: CPT | Performed by: NURSE PRACTITIONER

## 2020-12-11 PROCEDURE — 4040F PNEUMOC VAC/ADMIN/RCVD: CPT | Performed by: NURSE PRACTITIONER

## 2020-12-11 PROCEDURE — 4004F PT TOBACCO SCREEN RCVD TLK: CPT | Performed by: NURSE PRACTITIONER

## 2020-12-11 PROCEDURE — 1123F ACP DISCUSS/DSCN MKR DOCD: CPT | Performed by: NURSE PRACTITIONER

## 2020-12-11 RX ORDER — PHENAZOPYRIDINE HYDROCHLORIDE 100 MG/1
100 TABLET, FILM COATED ORAL 3 TIMES DAILY PRN
COMMUNITY
End: 2021-01-25 | Stop reason: ALTCHOICE

## 2020-12-11 RX ORDER — ATORVASTATIN CALCIUM 40 MG/1
TABLET, FILM COATED ORAL
Qty: 90 TABLET | Refills: 3 | Status: SHIPPED | OUTPATIENT
Start: 2020-12-11 | End: 2021-09-20 | Stop reason: SDUPTHER

## 2020-12-11 ASSESSMENT — ENCOUNTER SYMPTOMS
SHORTNESS OF BREATH: 1
GASTROINTESTINAL NEGATIVE: 1

## 2020-12-11 NOTE — LETTER
[address]   Cardiology Note              Date:  December 11, 2020  Patientname: Layla Hoffmann  YOB: 1955    Primary Care physician: Nalini Kohli MD    HISTORY OF PRESENT ILLNESS: Layla Hoffmann is a 72 y.o. male with a history of CAD, COPD, HTN, HLD, DM. Stress test in 6/8/16 was abnormal. Coronary angiography on 6/10/16 showed 70% diag as well as moderate nonobstructive CAD, medical management. Today he presents for follow up for CAD, HTN, HLD. He has shortness of breath with strenuous exertion. He has no chest pain, palpitations, dizziness. He smokes. He does not check BP at home. He continues to lose weight with diet changes. He has a chronic [place] catheter. Past Medical History:   has a past medical history of Benign prostatic hyperplasia with nocturia, CAD (coronary artery disease), COPD (chronic obstructive pulmonary disease) ([address] H/O colonoscopy, HTN (hypertension), Hyperlipidemia, and PSA elevation. Past Surgical History:   has a past surgical history that includes [place] tooth extraction. Home Medications:    Prior to Admission medications    Medication Sig Start Date End Date Taking?  Authorizing Provider   amLODIPine (NORVASC) 5 MG tablet Take 1 tablet by mouth daily 10/2/20   Mona Matamoros MD   atorvastatin (LIPITOR) 40 MG tablet TAKE 1 TABLET BY MOUTH ONce nightly 10/2/20   Mona Matamoros MD   hydroCHLOROthiazide (HYDRODIURIL) 12.5 MG tablet Take 1 tablet by mouth daily 10/2/20   Mona Matamoros MD   metoprolol tartrate (LOPRESSOR) 25 MG tablet Take 1 tablet by mouth 2 times daily 10/2/20   Mona Matamoros MD   tamsulosin (FLOMAX) 0.4 MG capsule TAKE ONE (1) CAPSULE BY MOUTH ONCE DAILY 9/8/20   Nalini Kohli MD   Homeopathic Products (SPEEDGEL) GEL Apply 3 g topically 4 times daily Formula #8E Baclo 2%, Diclo 3%, DMSO 5%, Renan 6%, Lido 2%, prilo 2% Pharm to comp 10/23/19   TRAY Rubio nitroGLYCERIN (NITROSTAT) 0.4 MG SL tablet PLACE ONE TABLET UNDER TONGUE FOR CHEST PAIN, MAY REPEAT EVERY 5 MINUTES, AFTER 3 DOSES CALL 911 5/9/19   RUTHANN Hidalgo CNP   Handicap Placard MISC by Does not apply route Duration:5years 11/29/17   Augusto Abrams MD   aspirin 81 MG chewable tablet Take 1 tablet by mouth daily 6/10/16   Kyra Puga MD     Allergies:  Valium    Social History:   reports that he has been smoking cigarettes. He has a 30.00 pack-year smoking history. He has never used smokeless tobacco. He reports that he does not drink alcohol or use drugs. Family History: family history includes Heart Disease in his brother; No Known Problems in his father, maternal grandfather, maternal grandmother, mother, paternal grandfather, paternal grandmother, and sister. Review of Systems   Review of Systems   Constitutional: Negative. Respiratory: Positive for shortness of breath. Cardiovascular: Negative. Gastrointestinal: Negative. Neurological: Negative. OBJECTIVE:    Vital signs:    /62   Pulse 71   Ht 5' 10\" (1.778 m)   Wt 230 lb (104.3 kg)   SpO2 97%   BMI 33.00 kg/m²      Physical Exam:  Constitutional:  Comfortable and alert, NAD, appears stated age  Eyes: PERRL, sclera nonicteric  Neck:  Supple, no masses, no thyroidmegaly, no JVD  Skin:  Warm and dry; no rash or lesions  Heart: Regular, normal apex, S1 and S2 normal, no M/G/R  Lungs:  Normal respiratory effort; clear; no wheezing/rhonchi/rales  Abdomen: soft, non tender, + bowel sounds  Extremities:  No edema or cyanosis; no clubbing  Neuro: alert and oriented, moves legs and arms equally, normal mood and affect    Data Reviewed:      Coronary angiogram 6/10/2016:  IMPRESSION:  1.  A 70% ostial medium-caliber first diagonal branch. 2.  A 50% mid-left anterior descending. 3.  Normal left ventricular systolic function with ejection fraction of 55%. 3.  Mildly elevated left ventricular end-diastolic pressure, 17 mmHg, which  is from uncontrolled hypertension. RECOMMENDATION:  Patient has branch vessel disease, which is also in the  territory of his abnormal stress test.  Will treat the diagonal branch  disease medically as attempts to revascularize the branch may jeopardize the  main LAD trunk.  His blood pressure was also elevated on presentation to the  hospital, and  needs optimal therapy for the blood pressure.  Will start him   on Imdur 30 mg daily as well as Lopressor 25 mg twice a day for his angina. He will continue with the Norvasc for the blood pressure.  He will continue  low-dose aspirin as well as moderate-dose Lipitor for coronary  artery   Disease. Stress test 6/9/2016:  There is a small mildly reversible apical lateral defect from soft tissue    attenuation versus ischemia. Correlate clinically.    There are no regional wall motion abnormalities.    Normal left ventricular systolic function with ejection fraction of 65 %.    Abnormal low risk myocardial perfusion study. Cardiology Labs Reviewed:   CBC: No results for input(s): WBC, HGB, HCT, PLT in the last 72 hours. BMP:No results for input(s): NA, K, CO2, BUN, CREATININE, LABGLOM, GLUCOSE in the last 72 hours. PT/INR: No results for input(s): PROTIME, INR in the last 72 hours. APTT:No results for input(s): APTT in the last 72 hours. FASTING LIPID PANEL:  Lab Results   Component Value Date    HDL 18 06/09/2016    LDLDIRECT 120 06/09/2016    LDLCALC see below 06/09/2016    TRIG 427 06/09/2016     LIVER PROFILE:No results for input(s): AST, ALT, ALB in the last 72 hours.   BNP:   Lab Results   Component Value Date    PROBNP 119 03/21/2018    PROBNP 81 06/08/2016     Reviewed all labs and imaging today    Assessment:   Shortness of breath: ongoing  CAD: stable, no angina; moderate on angiogram, medical management 6/10/2016  HTN: controlled Hypotension: improved off amlodipine, imdur, HCTZ  HLD: uncontrolled,  in 6/2016, on statin, he has not obtained repeat lipids  Tobacco abuse: counseled  DM: hgb a1c 14.6    Plan:   1. Echocardiogram due to shortness of breath  2. Continue aspirin, statin, lopressor  3. Lipids/LFTs; these have been ordered at each office visit but patient has not followed up  4. Quit smoking, regular exercise and healthy diet encouraged  5. Check BP at home and call the office if consistently out of goal range  6.  Follow up in 1 year    Leann Carvalho, [address]  [phone]

## 2020-12-11 NOTE — PROGRESS NOTES
[address]   Cardiology Note              Date:  December 11, 2020  Patientname: Ashwin Freeman  YOB: 1955    Primary Care physician: Javi Peterson MD    HISTORY OF PRESENT ILLNESS: Ashwin Freeman is a 72 y.o. male with a history of CAD, COPD, HTN, HLD, DM. Stress test in 6/8/16 was abnormal. Coronary angiography on 6/10/16 showed 70% diag as well as moderate nonobstructive CAD, medical management. Today he presents for follow up for CAD, HTN, HLD. He has shortness of breath with strenuous exertion. He has no chest pain, palpitations, dizziness. He smokes. He does not check BP at home. He continues to lose weight with diet changes. He has a chronic [place] catheter. Past Medical History:   has a past medical history of Benign prostatic hyperplasia with nocturia, CAD (coronary artery disease), COPD (chronic obstructive pulmonary disease) ([address] H/O colonoscopy, HTN (hypertension), Hyperlipidemia, and PSA elevation. Past Surgical History:   has a past surgical history that includes [place] tooth extraction. Home Medications:    Prior to Admission medications    Medication Sig Start Date End Date Taking?  Authorizing Provider   amLODIPine (NORVASC) 5 MG tablet Take 1 tablet by mouth daily 10/2/20   Adriano Banks MD   atorvastatin (LIPITOR) 40 MG tablet TAKE 1 TABLET BY MOUTH ONce nightly 10/2/20   Adriano Banks MD   hydroCHLOROthiazide (HYDRODIURIL) 12.5 MG tablet Take 1 tablet by mouth daily 10/2/20   Adriano Banks MD   metoprolol tartrate (LOPRESSOR) 25 MG tablet Take 1 tablet by mouth 2 times daily 10/2/20   Adriano Banks MD   tamsulosin (FLOMAX) 0.4 MG capsule TAKE ONE (1) CAPSULE BY MOUTH ONCE DAILY 9/8/20   Javi Peterson MD   Homeopathic Products (SPEEDGEL) GEL Apply 3 g topically 4 times daily Formula #8E Baclo 2%, Diclo 3%, DMSO 5%, Renan 6%, Lido 2%, prilo 2% Pharm to comp 10/23/19   TRAY Early   nitroGLYCERIN (NITROSTAT) 0.4 MG SL tablet PLACE ONE TABLET UNDER TONGUE FOR CHEST PAIN, MAY REPEAT EVERY 5 MINUTES, AFTER 3 DOSES CALL 911 5/9/19   Luh Haque, APRN - CNP   Handicap Placard MISC by Does not apply route Duration:5years 11/29/17   Nolberto Koehler MD   aspirin 81 MG chewable tablet Take 1 tablet by mouth daily 6/10/16   Cayden Avina MD     Allergies:  Valium    Social History:   reports that he has been smoking cigarettes. He has a 30.00 pack-year smoking history. He has never used smokeless tobacco. He reports that he does not drink alcohol or use drugs. Family History: family history includes Heart Disease in his brother; No Known Problems in his father, maternal grandfather, maternal grandmother, mother, paternal grandfather, paternal grandmother, and sister. Review of Systems   Review of Systems   Constitutional: Negative. Respiratory: Positive for shortness of breath. Cardiovascular: Negative. Gastrointestinal: Negative. Neurological: Negative. OBJECTIVE:    Vital signs:    /62   Pulse 71   Ht 5' 10\" (1.778 m)   Wt 230 lb (104.3 kg)   SpO2 97%   BMI 33.00 kg/m²      Physical Exam:  Constitutional:  Comfortable and alert, NAD, appears stated age  Eyes: PERRL, sclera nonicteric  Neck:  Supple, no masses, no thyroidmegaly, no JVD  Skin:  Warm and dry; no rash or lesions  Heart: Regular, normal apex, S1 and S2 normal, no M/G/R  Lungs:  Normal respiratory effort; clear; no wheezing/rhonchi/rales  Abdomen: soft, non tender, + bowel sounds  Extremities:  No edema or cyanosis; no clubbing  Neuro: alert and oriented, moves legs and arms equally, normal mood and affect    Data Reviewed:      Coronary angiogram 6/10/2016:  IMPRESSION:  1.  A 70% ostial medium-caliber first diagonal branch. 2.  A 50% mid-left anterior descending. 3.  Normal left ventricular systolic function with ejection fraction of 55%.   3.  Mildly elevated left ventricular end-diastolic pressure, 17 mmHg, which  is from uncontrolled hypertension. RECOMMENDATION:  Patient has branch vessel disease, which is also in the  territory of his abnormal stress test.  Will treat the diagonal branch  disease medically as attempts to revascularize the branch may jeopardize the  main LAD trunk.  His blood pressure was also elevated on presentation to the  hospital, and  needs optimal therapy for the blood pressure.  Will start him   on Imdur 30 mg daily as well as Lopressor 25 mg twice a day for his angina. He will continue with the Norvasc for the blood pressure.  He will continue  low-dose aspirin as well as moderate-dose Lipitor for coronary  artery   Disease. Stress test 6/9/2016:  There is a small mildly reversible apical lateral defect from soft tissue    attenuation versus ischemia. Correlate clinically.    There are no regional wall motion abnormalities.    Normal left ventricular systolic function with ejection fraction of 65 %.    Abnormal low risk myocardial perfusion study. Cardiology Labs Reviewed:   CBC: No results for input(s): WBC, HGB, HCT, PLT in the last 72 hours. BMP:No results for input(s): NA, K, CO2, BUN, CREATININE, LABGLOM, GLUCOSE in the last 72 hours. PT/INR: No results for input(s): PROTIME, INR in the last 72 hours. APTT:No results for input(s): APTT in the last 72 hours. FASTING LIPID PANEL:  Lab Results   Component Value Date    HDL 18 06/09/2016    LDLDIRECT 120 06/09/2016    LDLCALC see below 06/09/2016    TRIG 427 06/09/2016     LIVER PROFILE:No results for input(s): AST, ALT, ALB in the last 72 hours.   BNP:   Lab Results   Component Value Date    PROBNP 119 03/21/2018    PROBNP 81 06/08/2016     Reviewed all labs and imaging today    Assessment:   Shortness of breath: ongoing  CAD: stable, no angina; moderate on angiogram, medical management 6/10/2016  HTN: controlled  Hypotension: improved off amlodipine, imdur, HCTZ  HLD: uncontrolled,  in 6/2016, on statin, he has not obtained repeat lipids  Tobacco abuse: counseled  DM: hgb a1c 14.6    Plan:   1. Echocardiogram due to shortness of breath  2. Continue aspirin, statin, lopressor  3. Lipids/LFTs; these have been ordered at each office visit but patient has not followed up  4. Quit smoking, regular exercise and healthy diet encouraged  5. Check BP at home and call the office if consistently out of goal range  6.  Follow up in 1 year    Celso Finch, [address]  [phone]

## 2020-12-30 ENCOUNTER — TELEPHONE (OUTPATIENT)
Dept: FAMILY MEDICINE CLINIC | Age: 65
End: 2020-12-30

## 2020-12-30 RX ORDER — INSULIN DETEMIR 100 [IU]/ML
INJECTION, SOLUTION SUBCUTANEOUS
COMMUNITY
Start: 2020-11-29 | End: 2020-12-30 | Stop reason: SDUPTHER

## 2020-12-30 RX ORDER — METFORMIN HYDROCHLORIDE 500 MG/1
500 TABLET, EXTENDED RELEASE ORAL 2 TIMES DAILY
COMMUNITY
Start: 2020-11-29 | End: 2020-12-30 | Stop reason: SDUPTHER

## 2020-12-30 RX ORDER — TAMSULOSIN HYDROCHLORIDE 0.4 MG/1
CAPSULE ORAL
Qty: 30 CAPSULE | Refills: 2 | Status: SHIPPED | OUTPATIENT
Start: 2020-12-30 | End: 2021-03-09

## 2020-12-30 RX ORDER — METFORMIN HYDROCHLORIDE 500 MG/1
500 TABLET, EXTENDED RELEASE ORAL 2 TIMES DAILY
Qty: 60 TABLET | Refills: 0 | Status: SHIPPED | OUTPATIENT
Start: 2020-12-30 | End: 2021-02-01

## 2020-12-30 RX ORDER — INSULIN DETEMIR 100 [IU]/ML
25 INJECTION, SOLUTION SUBCUTANEOUS NIGHTLY
Qty: 5 PEN | Refills: 0 | Status: SHIPPED | OUTPATIENT
Start: 2020-12-30 | End: 2021-03-03 | Stop reason: SDUPTHER

## 2020-12-30 NOTE — TELEPHONE ENCOUNTER
Patient refill;    Meformin    Levemir Flex Pin    [address], [address]  [phone] [facility] [phone]   [facility] [address]   Phone:  [phone]  Fax:  [phone]    OV: 6/17/2020

## 2020-12-30 NOTE — TELEPHONE ENCOUNTER
Med pending with new pharm. Pt wants meds to go to [facility].     Pt states that he takes 25 units sc nightly of levemir

## 2021-01-13 ENCOUNTER — TELEPHONE (OUTPATIENT)
Dept: CARDIOLOGY CLINIC | Age: 66
End: 2021-01-13

## 2021-01-13 NOTE — TELEPHONE ENCOUNTER
Spoke with patient. Message relayed to patient. He voiced understanding and will get the Echo done. Called Dr. Nunez End office as well and informed them that patient is to have this done prior to having clearance.

## 2021-01-13 NOTE — TELEPHONE ENCOUNTER
CARDIAC CLEARANCE REQUEST    What type of procedure are you having: cystocopy bipolar turp    Are you taking any blood thinners: no med hold    When is your procedure scheduled for: 2-1-21    What physician is performing your procedure:     Phone Number:     Fax number to send the letter: [phone]    Office will be sending cardiac clx form as well to be filled out.

## 2021-01-14 NOTE — PROGRESS NOTES
The [facility] / [facility] ([facility]) [address] [facility], [address]    Acknowledgment of Informed Consent for Surgical or Medical Procedure and Sedation  I agree to allow doctor(s) Alli Irving and his/her associates or assistants, including residents and/or other qualified medical practitioner to perform the following medical treatment or procedure and to administer or direct the administration of sedation as necessary:  Procedure(s): [address]  My doctor has explained the following regarding the proposed procedure:   the explanation of the procedure   the benefits of the procedure   the potential problems that might occur during recuperation   the risks and side effects of the procedure which could include but are not limited to severe blood loss, infection, stroke or death   the benefits, risks and side effect of alternative procedures including the consequences of declining this procedure or any alternative procedures   the likelihood of achieving satisfactory results. I acknowledge no guarantee or assurance has been made to me regarding the results. I understand that during the course of this treatment/procedure, unforeseen conditions can occur which require an additional or different procedure. I agree to allow my physician or assistants to perform such extension of the original procedure as they may find necessary. I understand that sedation will often result in temporary impairment of memory and fine motor skills and that sedation can occasionally progress to a state of deep sedation or general anesthesia. I understand the risks of anesthesia for surgery include, but are not limited to, sore throat, hoarseness, injury to face, mouth, or teeth; nausea; headache; injury to blood vessels or nerves; death, brain damage, or paralysis.     I understand that if I have a Limitation of Treatment order in effect during my hospitalization, the order may or may not be in effect during this procedure. I give my doctor permission to give me blood or blood products. I understand that there are risks with receiving blood such as hepatitis, AIDS, fever, or allergic reaction. I acknowledge that the risks, benefits, and alternatives of this treatment have been explained to me and that no express or implied warranty has been given by the hospital, any blood bank, or any person or entity as to the blood or blood components transfused. At the discretion of my doctor, I agree to allow observers, equipment/product representatives and allow photographing, and/or televising of the procedure, provided my name or identity is maintained confidentially. I agree the hospital may dispose of or use for scientific or educational purposes any tissue, fluid, or body parts which may be removed.     ________________________________Date________Time______ am/pm  ([facility])  Patient or Signature of Closest Relative or Legal Guardian    ________________________________Date________Time______am/pm      Page 1 of  1  Witness

## 2021-01-20 ENCOUNTER — TELEPHONE (OUTPATIENT)
Dept: CARDIOLOGY CLINIC | Age: 66
End: 2021-01-20

## 2021-01-21 ENCOUNTER — TELEPHONE (OUTPATIENT)
Dept: CARDIOLOGY CLINIC | Age: 66
End: 2021-01-21

## 2021-01-21 DIAGNOSIS — R06.02 SHORTNESS OF BREATH: Primary | ICD-10-CM

## 2021-01-21 NOTE — TELEPHONE ENCOUNTER
Spoke to pt. He has an echo scheduled for this Saturday 1/23/21, and I gave him the number to central scheduling so he can schedule a lexscan d/t his SOB per SRJ. Orders for lexiscan have been put in epic. SRJ please advise.          TY

## 2021-01-22 ENCOUNTER — TELEPHONE (OUTPATIENT)
Dept: CARDIOLOGY CLINIC | Age: 66
End: 2021-01-22

## 2021-01-23 ENCOUNTER — HOSPITAL ENCOUNTER (OUTPATIENT)
Dept: NON INVASIVE DIAGNOSTICS | Age: 66
Discharge: HOME | End: 2021-01-23
Payer: MEDICARE

## 2021-01-23 DIAGNOSIS — R06.02 SHORTNESS OF BREATH: ICD-10-CM

## 2021-01-23 LAB
LV EF: 58 %
LV EF: 70 %
LVEF MODALITY: NORMAL
LVEF MODALITY: NORMAL

## 2021-01-23 PROCEDURE — A9502 TC99M TETROFOSMIN: HCPCS | Performed by: INTERNAL MEDICINE

## 2021-01-23 PROCEDURE — 93017 CV STRESS TEST TRACING ONLY: CPT

## 2021-01-23 PROCEDURE — 2580000003 HC RX 258: Performed by: INTERNAL MEDICINE

## 2021-01-23 PROCEDURE — 6360000002 HC RX W HCPCS: Performed by: INTERNAL MEDICINE

## 2021-01-23 PROCEDURE — 93306 TTE W/DOPPLER COMPLETE: CPT

## 2021-01-23 PROCEDURE — 3430000000 HC RX DIAGNOSTIC RADIOPHARMACEUTICAL: Performed by: INTERNAL MEDICINE

## 2021-01-23 PROCEDURE — 78452 HT MUSCLE IMAGE SPECT MULT: CPT

## 2021-01-23 RX ORDER — SODIUM CHLORIDE 0.9 % (FLUSH) 0.9 %
10 SYRINGE (ML) INJECTION PRN
Status: DISCONTINUED | OUTPATIENT
Start: 2021-01-23 | End: 2021-01-24 | Stop reason: HOSPADM

## 2021-01-23 RX ADMIN — TETROFOSMIN 10 MILLICURIE: 1.38 INJECTION, POWDER, LYOPHILIZED, FOR SOLUTION INTRAVENOUS at 08:23

## 2021-01-23 RX ADMIN — Medication 10 ML: at 10:03

## 2021-01-23 RX ADMIN — REGADENOSON 0.4 MG: 0.08 INJECTION, SOLUTION INTRAVENOUS at 10:00

## 2021-01-23 RX ADMIN — Medication 10 ML: at 08:24

## 2021-01-23 RX ADMIN — TETROFOSMIN 30 MILLICURIE: 1.38 INJECTION, POWDER, LYOPHILIZED, FOR SOLUTION INTRAVENOUS at 10:03

## 2021-01-25 ENCOUNTER — TELEPHONE (OUTPATIENT)
Dept: FAMILY MEDICINE CLINIC | Age: 66
End: 2021-01-25

## 2021-01-25 ENCOUNTER — TELEPHONE (OUTPATIENT)
Dept: CARDIOLOGY CLINIC | Age: 66
End: 2021-01-25

## 2021-01-25 NOTE — TELEPHONE ENCOUNTER
Cardiac clearance letter has been typed and faxed to the [facility] per [facility].        TY

## 2021-01-25 NOTE — PROGRESS NOTES
Patient unable to see PCP for H&P. Office will discuss with Dr Dariela Flores to see if she will do DOS.

## 2021-01-25 NOTE — TELEPHONE ENCOUNTER
[facility]rology group called to check status of cardiac clearance faxed over on 1/13 and request to hold ASA starting today for procedure on 2/1.  Please fax to [phone]

## 2021-01-25 NOTE — TELEPHONE ENCOUNTER
----- Message from Marcelo Cuevas sent at 1/25/2021  3:24 PM EST -----  Subject: Appointment Request    Reason for Call: Routine Pre-Op    QUESTIONS  Type of Appointment? Established Patient  Reason for appointment request? No appointments available during search  Additional Information for Provider? Pt needs pre-op appt before his   surgery 2/1/21. Pt having part of his prostate removed. Surgery at [facility] in [place] by Dr. Nkechi De Jesus. EKG is needed. Ines green. No appt   found during appt search.  ---------------------------------------------------------------------------  --------------  CALL BACK INFO  What is the best way for the office to contact you? OK to leave message on   voicemail  Preferred Call Back Phone Number? [phone]  ---------------------------------------------------------------------------  --------------  SCRIPT ANSWERS  Relationship to Patient? Self  Appointment reason? Symptomatic  Select script based on patient symptoms? Adult Pre-Op   Do you have question for your provider that need to be answered prior to   scheduling your pre-op appointment? No  Have you been diagnosed with   tested for   or told that you are suspected of having COVID-19 (Coronavirus)? No  Have you had a fever or taken medication to treat a fever within the past   3 days? No  Have you had a cough   shortness of breath or flu-like symptoms within the past 3 days? No  Do you currently have flu-like symptoms including fever or chills   cough   shortness of breath   or difficulty breathing   or new loss of taste or smell? No  (Service Expert  click yes below to proceed with [employer] As [employer]ual   Scheduling)?  Yes

## 2021-01-25 NOTE — TELEPHONE ENCOUNTER
----- Message from RUTHANN De La Rosa CNP sent at 1/25/2021  8:12 AM EST -----  Please let him know that echo shows normal heart strength. No changes, follow up as planned. Ok to proceed with surgery from a cardiac perspective. Thanks!

## 2021-01-25 NOTE — TELEPHONE ENCOUNTER
----- Message from Lucrecia Camacho MD sent at 1/23/2021  2:57 PM EST -----  Let patient know their stress test is normal, continue current meds, no new orders or changes at this time. Thanks.

## 2021-01-25 NOTE — TELEPHONE ENCOUNTER
Created telephone encounter. Per Pt HIPAA from can leave results on machine. LMOM relaying message per [facility]. Pt to call the office with any concerns.

## 2021-01-25 NOTE — TELEPHONE ENCOUNTER
Created telephone encounter. Spoke with Stepan Moise relayed message per [facility] regarding stress test . Pt verbalized understanding.

## 2021-01-25 NOTE — PROGRESS NOTES
[phone]. 4. Please call [phone] option #2 option #2 if you have not been preregistered yet. On the day of your procedure bring your insurance card and photo ID. You will be registered at your bedside once brought back to your room. 5. DO NOT EAT ANYTHING eight hours prior to your arrival for surgery. May have 8 ounces of water 4 hours prior to your arrival for surgery. NOTE: ALL Gastric, Bariatric and Bowel surgery patients MUST follow their surgeon's instructions. 6. MEDICATIONS    Take the following medications with a SMALL sip of water: metoprolol. Told to call PCP and ask about PM insulin.  Use your usual dose of inhalers the morning of surgery. BRING your rescue inhaler with you to hospital.    Anesthesia does NOT want you to take insulin the morning of surgery. They will control your blood sugar while you are at the hospital. Please contact your ordering physician for instructions regarding your insulin the night before your procedure. If you have an insulin pump, please keep it set on basal rate. 7. Do not swallow water when brushing teeth. No gum, candy, mints or ice chips. Refrain from smoking or at least decrease the amount. 8. Dress in loose, comfortable clothing appropriate for redressing after your procedure. Do not wear jewelry (including body piercings), make-up (especially NO eye make-up), fingernail polish (NO toenail polish if foot/leg surgery), lotion, powders or metal hairclips. 9. Dentures, glasses, or contacts will need to be removed before your procedure. Bring cases for your glasses, contacts, dentures, or hearing aids to protect them while you are in surgery. 10. If you use a CPAP, please bring it with you on the day of your procedure. 11. We recommend that valuable personal  belongings such as cash, cell phones, e-tablets or jewelry, be left at home during your stay.  The hospital will not be responsible for valuables that are not secured in the hospital safe. However, if your insurance requires a co-pay, you may want to bring a method of payment, i.e. Check or credit card, if you wish to pay your co-pay the day of surgery. 12. If you are to stay overnight, you may bring a bag with personal items. Please have any large items you may need brought in by your family after your arrival to your hospital room. 15. If you have a Living Will or Durable Power of , please bring a copy on the day of your procedure. 15. With your permission, one family member may accompany you while you are being prepared for surgery. Once you are ready, additional family members may join you. HOW WE KEEP YOU SAFE and WORK TO PREVENT SURGICAL SITE INFECTIONS:  1. Health care workers should always check your ID bracelet to verify your name and birth date. You will be asked many times to state your name, date of birth, and allergies. 2. Health care workers should always clean their hands with soap or alcohol gel before providing care to you. It is okay to ask anyone if they cleaned their hands before they touch you. 3. You will be actively involved in verifying the type of procedure you are having and ensuring the correct surgical site. This will be confirmed multiple times prior to your procedure. Do NOT naif your surgery site UNLESS instructed to by your surgeon. 4. Do not shave or wax for 72 hours prior to procedure near your operative site. Shaving with a razor can irritate your skin and make it easier to develop an infection. On the day of your procedure, any hair that needs to be removed near the surgical site will be clipped by a healthcare worker using a special clippers designed to avoid skin irritation. 5. When you are in the operating room, your surgical site will be cleansed with a special soap, and in most cases, you will be given an antibiotic before the surgery begins. What to expect AFTER YOUR PROCEDURE:  1.  Immediately following your procedure, your will be taken to the PACU for the first phase of your recovery. Your nurse will help you recover from any potential side effects of anesthesia, such as extreme drowsiness, changes in your vital signs or breathing patterns. Nausea, headache, muscle aches, or sore throat may also occur after anesthesia. Your nurse will help you manage these potential side effects. 2. For comfort and safety, arrange to have someone at home with you for the first 24 hours after discharge. 3. You and your family will be given written instructions about your diet, activity, dressing care, medications, and return visits. 4. Once at home, should issues with nausea, pain, or bleeding occur, or should you notice any signs of infection, you should call your surgeon. 5. Always clean your hands before and after caring for your wound. Do not let your family touch your surgery site without cleaning their hands. 6. Narcotic pain medications can cause significant constipation. You may want to add a stool softener to your postoperative medication schedule or speak to your surgeon on how best to manage this SIDE EFFECT. SPECIAL INSTRUCTIONS   Thank you for allowing us to care for you. We strive to exceed your expectations in the delivery of care and service provided to you and your family. If you need to contact the [place] [address] staff for any reason, please call us at [phone]    Instructions reviewed with patient during preadmission testing phone interview. Dilan Vicente. 1/25/2021 .4:27 PM      ADDITIONAL EDUCATIONAL INFORMATION REVIEWED PER PHONE WITH YOU AND/OR YOUR FAMILY:    Yes Antibacterial Soap

## 2021-01-25 NOTE — TELEPHONE ENCOUNTER
SRj how long should pt hold the aspirin prior to their procedure that is scheduled for 2/1. SRJ please advise.       TY

## 2021-01-26 ENCOUNTER — NURSE ONLY (OUTPATIENT)
Dept: PRIMARY CARE CLINIC | Age: 66
End: 2021-01-26
Payer: MEDICARE

## 2021-01-26 DIAGNOSIS — Z01.818 PREOP EXAMINATION: Primary | ICD-10-CM

## 2021-01-26 LAB — SARS-COV-2, PCR: NOT DETECTED

## 2021-01-26 PROCEDURE — 99211 OFF/OP EST MAY X REQ PHY/QHP: CPT | Performed by: NURSE PRACTITIONER

## 2021-01-26 NOTE — PROGRESS NOTES
Spoke with [facility], at Dr. Hamida Murphy. Dr. Asif Alva will do H&P DOS. Cardiac clearance in Epic. Surgeon office did labs and urine and will review results at their office.

## 2021-01-29 ENCOUNTER — ANESTHESIA EVENT (OUTPATIENT)
Dept: OPERATING ROOM | Age: 66
End: 2021-01-29
Payer: MEDICARE

## 2021-02-01 ENCOUNTER — ANESTHESIA (OUTPATIENT)
Dept: OPERATING ROOM | Age: 66
End: 2021-02-01
Payer: MEDICARE

## 2021-02-01 ENCOUNTER — HOSPITAL ENCOUNTER (OUTPATIENT)
Age: 66
Setting detail: OBSERVATION
Discharge: HOME | End: 2021-02-02
Attending: UROLOGY | Admitting: UROLOGY
Payer: MEDICARE

## 2021-02-01 VITALS — DIASTOLIC BLOOD PRESSURE: 59 MMHG | SYSTOLIC BLOOD PRESSURE: 104 MMHG | OXYGEN SATURATION: 100 % | TEMPERATURE: 95.4 F

## 2021-02-01 DIAGNOSIS — N40.1 BENIGN PROSTATIC HYPERPLASIA WITH LOWER URINARY TRACT SYMPTOMS, SYMPTOM DETAILS UNSPECIFIED: ICD-10-CM

## 2021-02-01 PROBLEM — N13.8 BENIGN LOCALIZED HYPERPLASIA OF PROSTATE WITH URINARY OBSTRUCTION: Status: ACTIVE | Noted: 2021-02-01

## 2021-02-01 LAB
APTT: 30.6 SEC (ref 24.2–36.2)
GLUCOSE BLD-MCNC: 130 MG/DL (ref 70–99)
GLUCOSE BLD-MCNC: 159 MG/DL (ref 70–99)
GLUCOSE BLD-MCNC: 97 MG/DL (ref 70–99)
GLUCOSE BLD-MCNC: 97 MG/DL (ref 70–99)
INR BLD: 1.17 (ref 0.86–1.14)
PERFORMED ON: ABNORMAL
PERFORMED ON: ABNORMAL
PERFORMED ON: NORMAL
PERFORMED ON: NORMAL
PROTHROMBIN TIME: 13.6 SEC (ref 10–13.2)

## 2021-02-01 PROCEDURE — 6370000000 HC RX 637 (ALT 250 FOR IP): Performed by: UROLOGY

## 2021-02-01 PROCEDURE — 2500000003 HC RX 250 WO HCPCS: Performed by: NURSE ANESTHETIST, CERTIFIED REGISTERED

## 2021-02-01 PROCEDURE — 6360000002 HC RX W HCPCS: Performed by: UROLOGY

## 2021-02-01 PROCEDURE — 88305 TISSUE EXAM BY PATHOLOGIST: CPT

## 2021-02-01 PROCEDURE — 85610 PROTHROMBIN TIME: CPT

## 2021-02-01 PROCEDURE — 3700000000 HC ANESTHESIA ATTENDED CARE: Performed by: UROLOGY

## 2021-02-01 PROCEDURE — 6360000002 HC RX W HCPCS: Performed by: NURSE ANESTHETIST, CERTIFIED REGISTERED

## 2021-02-01 PROCEDURE — 3700000001 HC ADD 15 MINUTES (ANESTHESIA): Performed by: UROLOGY

## 2021-02-01 PROCEDURE — 3600000003 HC SURGERY LEVEL 3 BASE: Performed by: UROLOGY

## 2021-02-01 PROCEDURE — 2580000003 HC RX 258: Performed by: UROLOGY

## 2021-02-01 PROCEDURE — 3600000013 HC SURGERY LEVEL 3 ADDTL 15MIN: Performed by: UROLOGY

## 2021-02-01 PROCEDURE — 2709999900 HC NON-CHARGEABLE SUPPLY: Performed by: UROLOGY

## 2021-02-01 PROCEDURE — 7100000000 HC PACU RECOVERY - FIRST 15 MIN: Performed by: UROLOGY

## 2021-02-01 PROCEDURE — 85730 THROMBOPLASTIN TIME PARTIAL: CPT

## 2021-02-01 PROCEDURE — G0378 HOSPITAL OBSERVATION PER HR: HCPCS

## 2021-02-01 PROCEDURE — 2720000010 HC SURG SUPPLY STERILE: Performed by: UROLOGY

## 2021-02-01 PROCEDURE — 7100000001 HC PACU RECOVERY - ADDTL 15 MIN: Performed by: UROLOGY

## 2021-02-01 PROCEDURE — 2580000003 HC RX 258: Performed by: ANESTHESIOLOGY

## 2021-02-01 RX ORDER — ROCURONIUM BROMIDE 10 MG/ML
INJECTION, SOLUTION INTRAVENOUS PRN
Status: DISCONTINUED | OUTPATIENT
Start: 2021-02-01 | End: 2021-02-01 | Stop reason: SDUPTHER

## 2021-02-01 RX ORDER — DEXTROSE MONOHYDRATE 50 MG/ML
100 INJECTION, SOLUTION INTRAVENOUS PRN
Status: DISCONTINUED | OUTPATIENT
Start: 2021-02-01 | End: 2021-02-02 | Stop reason: HOSPADM

## 2021-02-01 RX ORDER — ACETAMINOPHEN 500 MG
1000 TABLET ORAL EVERY 6 HOURS PRN
Status: DISCONTINUED | OUTPATIENT
Start: 2021-02-01 | End: 2021-02-02 | Stop reason: HOSPADM

## 2021-02-01 RX ORDER — MEPERIDINE HYDROCHLORIDE 25 MG/ML
12.5 INJECTION INTRAMUSCULAR; INTRAVENOUS; SUBCUTANEOUS EVERY 5 MIN PRN
Status: DISCONTINUED | OUTPATIENT
Start: 2021-02-01 | End: 2021-02-01 | Stop reason: HOSPADM

## 2021-02-01 RX ORDER — DEXAMETHASONE SODIUM PHOSPHATE 4 MG/ML
INJECTION, SOLUTION INTRA-ARTICULAR; INTRALESIONAL; INTRAMUSCULAR; INTRAVENOUS; SOFT TISSUE PRN
Status: DISCONTINUED | OUTPATIENT
Start: 2021-02-01 | End: 2021-02-01 | Stop reason: SDUPTHER

## 2021-02-01 RX ORDER — FENTANYL CITRATE 50 UG/ML
25 INJECTION, SOLUTION INTRAMUSCULAR; INTRAVENOUS EVERY 5 MIN PRN
Status: DISCONTINUED | OUTPATIENT
Start: 2021-02-01 | End: 2021-02-01 | Stop reason: HOSPADM

## 2021-02-01 RX ORDER — PROMETHAZINE HYDROCHLORIDE 25 MG/ML
6.25 INJECTION, SOLUTION INTRAMUSCULAR; INTRAVENOUS
Status: DISCONTINUED | OUTPATIENT
Start: 2021-02-01 | End: 2021-02-01 | Stop reason: HOSPADM

## 2021-02-01 RX ORDER — ATROPA BELLADONNA AND OPIUM 16.2; 6 MG/1; MG/1
60 SUPPOSITORY RECTAL EVERY 8 HOURS PRN
Status: DISCONTINUED | OUTPATIENT
Start: 2021-02-01 | End: 2021-02-02 | Stop reason: HOSPADM

## 2021-02-01 RX ORDER — LABETALOL 20 MG/4 ML (5 MG/ML) INTRAVENOUS SYRINGE
5 EVERY 10 MIN PRN
Status: DISCONTINUED | OUTPATIENT
Start: 2021-02-01 | End: 2021-02-01 | Stop reason: HOSPADM

## 2021-02-01 RX ORDER — METFORMIN HYDROCHLORIDE 500 MG/1
TABLET, EXTENDED RELEASE ORAL
Qty: 60 TABLET | Refills: 0 | Status: SHIPPED | OUTPATIENT
Start: 2021-02-01 | End: 2021-03-03 | Stop reason: SDUPTHER

## 2021-02-01 RX ORDER — SODIUM CHLORIDE, SODIUM LACTATE, POTASSIUM CHLORIDE, CALCIUM CHLORIDE 600; 310; 30; 20 MG/100ML; MG/100ML; MG/100ML; MG/100ML
INJECTION, SOLUTION INTRAVENOUS CONTINUOUS
Status: DISCONTINUED | OUTPATIENT
Start: 2021-02-01 | End: 2021-02-01

## 2021-02-01 RX ORDER — LIDOCAINE HYDROCHLORIDE 20 MG/ML
INJECTION, SOLUTION INTRAVENOUS PRN
Status: DISCONTINUED | OUTPATIENT
Start: 2021-02-01 | End: 2021-02-01 | Stop reason: SDUPTHER

## 2021-02-01 RX ORDER — NICOTINE POLACRILEX 4 MG
15 LOZENGE BUCCAL PRN
Status: DISCONTINUED | OUTPATIENT
Start: 2021-02-01 | End: 2021-02-02 | Stop reason: HOSPADM

## 2021-02-01 RX ORDER — ATORVASTATIN CALCIUM 40 MG/1
40 TABLET, FILM COATED ORAL NIGHTLY
Status: DISCONTINUED | OUTPATIENT
Start: 2021-02-01 | End: 2021-02-02 | Stop reason: HOSPADM

## 2021-02-01 RX ORDER — INSULIN LISPRO 100 [IU]/ML
0-6 INJECTION, SOLUTION INTRAVENOUS; SUBCUTANEOUS NIGHTLY
Status: DISCONTINUED | OUTPATIENT
Start: 2021-02-01 | End: 2021-02-02 | Stop reason: HOSPADM

## 2021-02-01 RX ORDER — DEXTROSE MONOHYDRATE 25 G/50ML
12.5 INJECTION, SOLUTION INTRAVENOUS PRN
Status: DISCONTINUED | OUTPATIENT
Start: 2021-02-01 | End: 2021-02-02 | Stop reason: HOSPADM

## 2021-02-01 RX ORDER — INSULIN LISPRO 100 [IU]/ML
0-12 INJECTION, SOLUTION INTRAVENOUS; SUBCUTANEOUS
Status: DISCONTINUED | OUTPATIENT
Start: 2021-02-01 | End: 2021-02-02 | Stop reason: HOSPADM

## 2021-02-01 RX ORDER — OXYCODONE HYDROCHLORIDE AND ACETAMINOPHEN 5; 325 MG/1; MG/1
1 TABLET ORAL
Status: DISCONTINUED | OUTPATIENT
Start: 2021-02-01 | End: 2021-02-01 | Stop reason: HOSPADM

## 2021-02-01 RX ORDER — FENTANYL CITRATE 50 UG/ML
INJECTION, SOLUTION INTRAMUSCULAR; INTRAVENOUS PRN
Status: DISCONTINUED | OUTPATIENT
Start: 2021-02-01 | End: 2021-02-01 | Stop reason: SDUPTHER

## 2021-02-01 RX ORDER — GLYCOPYRROLATE 0.2 MG/ML
INJECTION INTRAMUSCULAR; INTRAVENOUS PRN
Status: DISCONTINUED | OUTPATIENT
Start: 2021-02-01 | End: 2021-02-01 | Stop reason: SDUPTHER

## 2021-02-01 RX ORDER — PHENYLEPHRINE HYDROCHLORIDE 10 MG/ML
INJECTION INTRAVENOUS PRN
Status: DISCONTINUED | OUTPATIENT
Start: 2021-02-01 | End: 2021-02-01 | Stop reason: SDUPTHER

## 2021-02-01 RX ORDER — ONDANSETRON 2 MG/ML
4 INJECTION INTRAMUSCULAR; INTRAVENOUS
Status: DISCONTINUED | OUTPATIENT
Start: 2021-02-01 | End: 2021-02-01 | Stop reason: HOSPADM

## 2021-02-01 RX ORDER — HYDRALAZINE HYDROCHLORIDE 20 MG/ML
5 INJECTION INTRAMUSCULAR; INTRAVENOUS EVERY 10 MIN PRN
Status: DISCONTINUED | OUTPATIENT
Start: 2021-02-01 | End: 2021-02-01 | Stop reason: HOSPADM

## 2021-02-01 RX ORDER — FENTANYL CITRATE 50 UG/ML
50 INJECTION, SOLUTION INTRAMUSCULAR; INTRAVENOUS EVERY 5 MIN PRN
Status: DISCONTINUED | OUTPATIENT
Start: 2021-02-01 | End: 2021-02-01 | Stop reason: HOSPADM

## 2021-02-01 RX ORDER — CIPROFLOXACIN 2 MG/ML
400 INJECTION, SOLUTION INTRAVENOUS ONCE
Status: COMPLETED | OUTPATIENT
Start: 2021-02-01 | End: 2021-02-01

## 2021-02-01 RX ORDER — ATROPA BELLADONNA AND OPIUM 16.2; 6 MG/1; MG/1
SUPPOSITORY RECTAL PRN
Status: DISCONTINUED | OUTPATIENT
Start: 2021-02-01 | End: 2021-02-01

## 2021-02-01 RX ORDER — PROPOFOL 10 MG/ML
INJECTION, EMULSION INTRAVENOUS PRN
Status: DISCONTINUED | OUTPATIENT
Start: 2021-02-01 | End: 2021-02-01 | Stop reason: SDUPTHER

## 2021-02-01 RX ORDER — ONDANSETRON 2 MG/ML
INJECTION INTRAMUSCULAR; INTRAVENOUS PRN
Status: DISCONTINUED | OUTPATIENT
Start: 2021-02-01 | End: 2021-02-01 | Stop reason: SDUPTHER

## 2021-02-01 RX ORDER — MAGNESIUM HYDROXIDE 1200 MG/15ML
LIQUID ORAL CONTINUOUS PRN
Status: COMPLETED | OUTPATIENT
Start: 2021-02-01 | End: 2021-02-01

## 2021-02-01 RX ADMIN — FENTANYL CITRATE 50 MCG: 50 INJECTION INTRAMUSCULAR; INTRAVENOUS at 16:20

## 2021-02-01 RX ADMIN — ONDANSETRON 4 MG: 2 INJECTION INTRAMUSCULAR; INTRAVENOUS at 14:23

## 2021-02-01 RX ADMIN — METOPROLOL TARTRATE 25 MG: 25 TABLET, FILM COATED ORAL at 21:30

## 2021-02-01 RX ADMIN — SUGAMMADEX 200 MG: 100 INJECTION, SOLUTION INTRAVENOUS at 16:37

## 2021-02-01 RX ADMIN — SODIUM CHLORIDE, SODIUM LACTATE, POTASSIUM CHLORIDE, AND CALCIUM CHLORIDE: .6; .31; .03; .02 INJECTION, SOLUTION INTRAVENOUS at 14:19

## 2021-02-01 RX ADMIN — PHENYLEPHRINE HYDROCHLORIDE 50 MCG: 10 INJECTION INTRAVENOUS at 14:37

## 2021-02-01 RX ADMIN — FENTANYL CITRATE 100 MCG: 50 INJECTION INTRAMUSCULAR; INTRAVENOUS at 14:23

## 2021-02-01 RX ADMIN — PHENYLEPHRINE HYDROCHLORIDE 100 MCG: 10 INJECTION INTRAVENOUS at 14:33

## 2021-02-01 RX ADMIN — PHENYLEPHRINE HYDROCHLORIDE 150 MCG: 10 INJECTION INTRAVENOUS at 15:55

## 2021-02-01 RX ADMIN — ROCURONIUM BROMIDE 50 MG: 10 INJECTION INTRAVENOUS at 14:23

## 2021-02-01 RX ADMIN — PHENYLEPHRINE HYDROCHLORIDE 50 MCG: 10 INJECTION INTRAVENOUS at 15:40

## 2021-02-01 RX ADMIN — GLYCOPYRROLATE 0.2 MG: 0.2 INJECTION INTRAMUSCULAR; INTRAVENOUS at 14:36

## 2021-02-01 RX ADMIN — FENTANYL CITRATE 50 MCG: 50 INJECTION INTRAMUSCULAR; INTRAVENOUS at 14:55

## 2021-02-01 RX ADMIN — PHENYLEPHRINE HYDROCHLORIDE 100 MCG: 10 INJECTION INTRAVENOUS at 16:38

## 2021-02-01 RX ADMIN — LIDOCAINE HYDROCHLORIDE 60 MG: 20 INJECTION, SOLUTION INTRAVENOUS at 14:23

## 2021-02-01 RX ADMIN — PHENYLEPHRINE HYDROCHLORIDE 100 MCG: 10 INJECTION INTRAVENOUS at 16:35

## 2021-02-01 RX ADMIN — SUGAMMADEX 200 MG: 100 INJECTION, SOLUTION INTRAVENOUS at 16:38

## 2021-02-01 RX ADMIN — DEXAMETHASONE SODIUM PHOSPHATE 4 MG: 4 INJECTION, SOLUTION INTRAMUSCULAR; INTRAVENOUS at 14:23

## 2021-02-01 RX ADMIN — SODIUM CHLORIDE, SODIUM LACTATE, POTASSIUM CHLORIDE, AND CALCIUM CHLORIDE: .6; .31; .03; .02 INJECTION, SOLUTION INTRAVENOUS at 16:38

## 2021-02-01 RX ADMIN — CIPROFLOXACIN 400 MG: 2 INJECTION, SOLUTION INTRAVENOUS at 14:30

## 2021-02-01 RX ADMIN — INSULIN LISPRO 1 UNITS: 100 INJECTION, SOLUTION INTRAVENOUS; SUBCUTANEOUS at 21:36

## 2021-02-01 RX ADMIN — PHENYLEPHRINE HYDROCHLORIDE 50 MCG: 10 INJECTION INTRAVENOUS at 14:36

## 2021-02-01 RX ADMIN — PHENYLEPHRINE HYDROCHLORIDE 50 MCG: 10 INJECTION INTRAVENOUS at 15:38

## 2021-02-01 RX ADMIN — PROPOFOL 170 MG: 10 INJECTION, EMULSION INTRAVENOUS at 14:23

## 2021-02-01 RX ADMIN — ATORVASTATIN CALCIUM 40 MG: 40 TABLET, FILM COATED ORAL at 21:30

## 2021-02-01 ASSESSMENT — PULMONARY FUNCTION TESTS
PIF_VALUE: 18
PIF_VALUE: 16
PIF_VALUE: 17
PIF_VALUE: 16
PIF_VALUE: 16
PIF_VALUE: 8
PIF_VALUE: 18
PIF_VALUE: 1
PIF_VALUE: 17
PIF_VALUE: 16
PIF_VALUE: 17
PIF_VALUE: 17
PIF_VALUE: 19
PIF_VALUE: 4
PIF_VALUE: 17
PIF_VALUE: 17
PIF_VALUE: 2
PIF_VALUE: 16
PIF_VALUE: 16
PIF_VALUE: 19
PIF_VALUE: 2
PIF_VALUE: 1
PIF_VALUE: 17
PIF_VALUE: 16
PIF_VALUE: 18
PIF_VALUE: 14
PIF_VALUE: 16
PIF_VALUE: 17
PIF_VALUE: 0
PIF_VALUE: 18
PIF_VALUE: 16
PIF_VALUE: 16
PIF_VALUE: 17
PIF_VALUE: 18
PIF_VALUE: 17
PIF_VALUE: 16
PIF_VALUE: 17
PIF_VALUE: 18
PIF_VALUE: 16
PIF_VALUE: 16
PIF_VALUE: 17
PIF_VALUE: 18
PIF_VALUE: 16
PIF_VALUE: 16
PIF_VALUE: 17
PIF_VALUE: 4
PIF_VALUE: 17
PIF_VALUE: 17
PIF_VALUE: 18
PIF_VALUE: 16
PIF_VALUE: 16
PIF_VALUE: 17
PIF_VALUE: 1
PIF_VALUE: 16
PIF_VALUE: 16
PIF_VALUE: 14
PIF_VALUE: 17
PIF_VALUE: 0
PIF_VALUE: 18
PIF_VALUE: 16
PIF_VALUE: 17
PIF_VALUE: 18
PIF_VALUE: 16
PIF_VALUE: 0
PIF_VALUE: 17
PIF_VALUE: 18
PIF_VALUE: 16
PIF_VALUE: 21
PIF_VALUE: 17
PIF_VALUE: 16
PIF_VALUE: 19
PIF_VALUE: 19
PIF_VALUE: 18
PIF_VALUE: 17
PIF_VALUE: 17
PIF_VALUE: 16
PIF_VALUE: 16
PIF_VALUE: 14
PIF_VALUE: 4
PIF_VALUE: 17
PIF_VALUE: 0
PIF_VALUE: 17
PIF_VALUE: 0
PIF_VALUE: 17
PIF_VALUE: 16
PIF_VALUE: 18
PIF_VALUE: 17
PIF_VALUE: 17
PIF_VALUE: 16
PIF_VALUE: 17
PIF_VALUE: 16
PIF_VALUE: 18
PIF_VALUE: 0
PIF_VALUE: 16
PIF_VALUE: 18
PIF_VALUE: 16
PIF_VALUE: 18
PIF_VALUE: 17

## 2021-02-01 NOTE — ANESTHESIA POSTPROCEDURE EVALUATION
Department of Anesthesiology  Postprocedure Note    Patient: Johnathan Mcarthur  MRN: [id number]  YOB: 1955  Date of evaluation: 2/1/2021  Time:  6:01 PM     Procedure Summary     Date: 02/01/21 Room / Location: [address] [place] [address]    Anesthesia Start: 6458 Anesthesia Stop: 8585    Procedure: CYSTOSCOPY , BIPOLAR TRANSURETHRAL RESECTION OF PROSTATE (N/A ) Diagnosis:       Benign prostatic hyperplasia with lower urinary tract symptoms, symptom details unspecified      (Benign prostatic hyperplasia with lower urinary tract symptoms, symptom details unspecified [N40.1])    Surgeons: Rosetta Head MD Responsible Provider: Calvin Chavarria DO    Anesthesia Type: general ASA Status: 3          Anesthesia Type: general    Omaira Phase I: Omaira Score: 8    Omaira Phase II:      Last vitals: Reviewed and per EMR flowsheets.        Anesthesia Post Evaluation    Patient location during evaluation: PACU  Patient participation: complete - patient participated  Level of consciousness: awake and alert  Pain score: 0  Airway patency: patent  Nausea & Vomiting: no nausea and no vomiting  Cardiovascular status: blood pressure returned to baseline  Respiratory status: acceptable  Hydration status: euvolemic

## 2021-02-01 NOTE — BRIEF OP NOTE
Brief Postoperative Note      Patient: Perfecto Hollingsworth  YOB: 1955  MRN: [id number]    Date of Procedure: 2/1/2021    Pre-Op Diagnosis: Benign prostatic hyperplasia with lower urinary tract symptoms, symptom details unspecified [N40.1]    Post-Op Diagnosis: Same       Procedure(s):  CYSTOSCOPY , BIPOLAR TRANSURETHRAL RESECTION OF PROSTATE    Surgeon(s):  Shelia Rodriguez MD    Assistant:  * No surgical staff found *    Anesthesia: General    Estimated Blood Loss (mL): 942DW    Complications: None    Specimens:   ID Type Source Tests Collected by Time Destination   A : PROSTATE TISSUE Tissue Tissue SURGICAL PATHOLOGY Shelia Rodriguez MD 2/1/2021 1452        Implants:  * No implants in log *      Drains:   Urethral Catheter  22 fr (Active)       Findings: Significant BPH    Electronically signed by Shelia Rodriguez MD on 2/1/2021 at [address] PM

## 2021-02-01 NOTE — PROGRESS NOTES
PACU Transfer Note    Vitals:    02/01/21 1815   BP: 110/73   Pulse: 73   Resp: 15   Temp: 97.2 °F (36.2 °C)   SpO2: 100%       In: 1527 [I.V.:1527]  Out: 25     Pain assessment:  none  Pain Level: 0(Pain?  \"not that I know of\")    Report given to Receiving unit RN.    2/1/2021 6:23 PM

## 2021-02-01 NOTE — H&P
tablet by mouth 2 times daily 10/2/20  Yes Faizan Maciel MD   metFORMIN (GLUCOPHAGE-XR) 500 MG extended release tablet TAKE ONE TABLET BY MOUTH TWICE A DAY 2/1/21   Irma Stearns MD   INSULIN ASPART SC Inject into the skin UNKNOWN INSULIN    Historical Provider, MD   Homeopathic Products (SPEEDGEL) GEL Apply 3 g topically 4 times daily Formula #8E Baclo 2%, Diclo 3%, DMSO 5%, Renan 6%, Lido 2%, prilo 2% Pharm to comp  Patient not taking: Reported on 12/11/2020 10/23/19   TRAY Vu   nitroGLYCERIN (NITROSTAT) 0.4 MG SL tablet PLACE ONE TABLET UNDER TONGUE FOR CHEST PAIN, MAY REPEAT EVERY 5 MINUTES, AFTER 3 DOSES CALL 911 5/9/19   RUTHANN Caba CNP   Handicap Placard MISC by Does not apply route Duration:5years 11/29/17   Irma Stearns MD   aspirin 81 MG chewable tablet Take 1 tablet by mouth daily 6/10/16   Loy Guzman MD       Allergies:  Valium    Social History:   Social History     Socioeconomic History    Marital status:      Spouse name: None    Number of children: None    Years of education: None    Highest education level: None   Occupational History    Occupation: Retired:   Social Needs    Financial resource strain: None    Food insecurity     Worry: None     Inability: None    Transportation needs     Medical: None     Non-medical: None   Tobacco Use    Smoking status: Current Every Day Smoker     Packs/day: 1.00     Years: 30.00     Pack years: 30.00     Types: Cigarettes    Smokeless tobacco: Never Used   Substance and Sexual Activity    Alcohol use: No    Drug use: No    Sexual activity: Yes     Partners: Female     Comment: single- 5 children    Lifestyle    Physical activity     Days per week: None     Minutes per session: None    Stress: None   Relationships    Social connections     Talks on phone: None     Gets together: None     Attends [employer] service: None     Active member of club or organization: None     Attends meetings of clubs or organizations: None     Relationship status: None    Intimate partner violence     Fear of current or ex partner: None     Emotionally abused: None     Physically abused: None     Forced sexual activity: None   Other Topics Concern    None   Social History Narrative    None         Family History:       Problem Relation Age of Onset    No Known Problems Mother     No Known Problems Father     No Known Problems Sister     Heart Disease Brother     No Known Problems Maternal Grandmother     No Known Problems Maternal Grandfather     No Known Problems Paternal Grandmother     No Known Problems Paternal Grandfather          REVIEW OF SYSTEMS:    Constitutional: Negative for chills, fatigue and fever. HENT: Negative for loss of hearing   Eyes: Negative for visual disturbance. Respiratory: Negative for  cough, chest tightness, shortness of breath and wheezing. Cardiovascular: Negative for chest pain, palpitations and exertional chest pressure  Gastrointestinal: Negative for constipation, diarrhea, nausea and vomiting. Musculoskeletal: Negative for gait problem, and joint swelling. Skin: Negative for rash and nonhealing wounds. Neurological: Negative for dizziness, syncope, light-headedness,    Hematological: Does bruise/bleed easily. Psychiatric/Behavioral: Negative for agitation    PHYSICAL EXAM:      /71   Pulse 72   Temp 97.6 °F (36.4 °C) (Oral)   Resp 14   Ht 5' 10\" (1.778 m)   Wt 229 lb (103.9 kg)   SpO2 96%   BMI 32.86 kg/m²  I      Eyes:  pupils equal, round and reactive to light and conjunctiva normal    Head/ENT:  Normocephalic, without obvious abnormality, atramatic,     Neck:  Supple, symmetrical, trachea midline, no adenopathy, no tenderness, skin warm and dry.     Heart: regular rate and rhythm    Lungs:  No increased work of breathing, good air exchange, clear to auscultation bilaterally, no crackles or wheezing    Abdomen:  Normal bowel sounds, soft, non-distended, non-tender, no masses palpated    Extremities:  No clubbing, cyanosis,  Positive for LE edema      ASSESSMENT AND PLAN:    1. Patient is a 77 y.o. male with above specified procedure planned. 2.  Access to ancillary services are available per request of the provider.     Luh León   2/1/2021

## 2021-02-01 NOTE — PROGRESS NOTES
Report given to Diony Smith from 5S. SpO2 dropped in upper 80s for short period. O2 turned back on at 2L. Patient in stretcher. Diony Strangeach to call when bed in room.

## 2021-02-01 NOTE — PROGRESS NOTES
1706 Admitted to PACU from [address] [facility] [address]. Connected to monitor. Report at bedside. [place] bag emptied. New fluid hung. Dr Tommie Garcia here to see patient. Gauze sponge to stay in place on mad[place]. No IVF orders needed. Glasses from OR and phone left per wife - at bedside.

## 2021-02-01 NOTE — ANESTHESIA PRE PROCEDURE
Department of Anesthesiology  Preprocedure Note       Name:  Mitra Rick   Age:  77 y.o.  :  1955                                          MRN:  [id number]         Date:  2021      Surgeon: Eryn Connolly):  Home Ewing MD    Procedure: Procedure(s):  CYSTOSCOPY , BIPOLAR TRANSURETHRAL RESECTION OF PROSTATE    Medications prior to admission:   Prior to Admission medications    Medication Sig Start Date End Date Taking? Authorizing Provider   metFORMIN (GLUCOPHAGE-XR) 500 MG extended release tablet TAKE ONE TABLET BY MOUTH TWICE A DAY 21  Yes Rodney Montes MD   LEVEMIR FLEXTOUCH 100 UNIT/ML injection pen Inject 25 Units into the skin nightly 20  Yes Mukesh Henriquez MD   tamsulosin (FLOMAX) 0.4 MG capsule Take 1 cap po daily.  20  Yes Mukesh Henriquez MD   atorvastatin (LIPITOR) 40 MG tablet TAKE 1 TABLET BY MOUTH ONce nightly 20  Yes RUTHANN Penn CNP   metoprolol tartrate (LOPRESSOR) 25 MG tablet Take 1 tablet by mouth 2 times daily 10/2/20  Yes Ansted Blocker, MD   INSULIN ASPART SC Inject into the skin UNKNOWN INSULIN    Historical Provider, MD   Homeopathic Products (SPEEDGEL) GEL Apply 3 g topically 4 times daily Formula #8E Baclo 2%, Diclo 3%, DMSO 5%, Renan 6%, Lido 2%, prilo 2% Pharm to comp  Patient not taking: Reported on 2020 10/23/19   TRAY Osorio   nitroGLYCERIN (NITROSTAT) 0.4 MG SL tablet PLACE ONE TABLET UNDER TONGUE FOR CHEST PAIN, MAY REPEAT EVERY 5 MINUTES, AFTER 3 DOSES CALL 911 19   RUTHANN Penn CNP   Handicap Placard MISC by Does not apply route Duration:5years 17   Rodney Montes MD   aspirin 81 MG chewable tablet Take 1 tablet by mouth daily 6/10/16   Kandice Patrick MD       Current medications:    Current Facility-Administered Medications   Medication Dose Route Frequency Provider Last Rate Last Admin    ciprofloxacin (CIPRO) IVPB 400 mg  400 mg Intravenous Once Home Ewing MD  lactated ringers infusion   Intravenous Continuous Nyoka Cardinal, DO           Allergies: Allergies   Allergen Reactions    Valium Anxiety       Problem List:    Patient Active Problem List   Diagnosis Code    Chest pain R07.9    Tobacco abuse Z72.0    Elevated blood pressure reading R03.0    Angina pectoris syndrome (HCC) I20.9    Abnormal stress test R94.39    Coronary artery disease involving native coronary artery of native heart without angina pectoris I25.10    Mixed hyperlipidemia E78.2    Essential hypertension I10    Benign prostatic hyperplasia with nocturia N40.1, R35.1    Hard, firm prostate R39.89    PSA elevation R97.20    Blood glucose elevated R73.9       Past Medical History:        Diagnosis Date    Benign prostatic hyperplasia with nocturia 11/8/2017    CAD (coronary artery disease)     Managed by cardiology([facility]):Dr. Jeovanny Dickson    COPD (chronic obstructive pulmonary disease) ([facility]     [facility] ([facility]     H/O colonoscopy 05/17/2019    Dr. Raul Montano HTN (hypertension)     Managed by cardiology([facility]):Dr. Antoinette Plunkett Hyperlipidemia     Managed by cardiology([facility]):Dr. Antoinette Plunkett PSA elevation 2018    Under care of urology:Dr. King:[facility]. Bx advised if >5.0 PSA. Past Surgical History:        Procedure Laterality Date    COLONOSCOPY      CORONARY ANGIOPLASTY      WISDOM TOOTH EXTRACTION         Social History:    Social History     Tobacco Use    Smoking status: Current Every Day Smoker     Packs/day: 1.00     Years: 30.00     Pack years: 30.00     Types: Cigarettes    Smokeless tobacco: Never Used   Substance Use Topics    Alcohol use:  No                                Ready to quit: Not Answered  Counseling given: Not Answered      Vital Signs (Current):   Vitals:    01/25/21 1624 02/01/21 1204   BP:  109/71   Pulse:  72   Resp:  14   Temp:  97.6 °F (36.4 °C)   TempSrc:  Oral   SpO2:  96% Weight: 236 lb (107 kg) 229 lb (103.9 kg)   Height: 5' 10\" (1.778 m) 5' 10\" (1.778 m)                                              BP Readings from Last 3 Encounters:   02/01/21 109/71   12/11/20 104/62   10/02/20 80/60       NPO Status: Time of last liquid consumption: 2000                        Time of last solid consumption: 1400                        Date of last liquid consumption: 01/31/21                        Date of last solid food consumption: 01/31/21    BMI:   Wt Readings from Last 3 Encounters:   02/01/21 229 lb (103.9 kg)   12/11/20 230 lb (104.3 kg)   10/02/20 241 lb (109.3 kg)     Body mass index is 32.86 kg/m². CBC:   Lab Results   Component Value Date    WBC 6.9 06/10/2016    RBC 5.10 06/10/2016    HGB 15.6 06/10/2016    HCT 46.2 06/10/2016    MCV 90.6 06/10/2016    RDW 14.7 06/10/2016     06/10/2016       CMP:   Lab Results   Component Value Date     01/27/2021    K 4.3 01/27/2021     01/27/2021    CO2 28 01/27/2021    BUN 12 01/27/2021    CREATININE 0.5 01/27/2021    GFRAA >60 01/27/2021    AGRATIO 1.0 06/10/2016    LABGLOM >60 01/27/2021    GLUCOSE 122 01/27/2021    PROT 7.3 06/10/2016    CALCIUM 9.3 01/27/2021    BILITOT 0.4 06/10/2016    ALKPHOS 58 06/10/2016    AST 20 06/10/2016    ALT 16 06/10/2016       POC Tests: No results for input(s): POCGLU, POCNA, POCK, POCCL, POCBUN, POCHEMO, POCHCT in the last 72 hours.     Coags: No results found for: PROTIME, INR, APTT    HCG (If Applicable): No results found for: PREGTESTUR, PREGSERUM, HCG, HCGQUANT     ABGs: No results found for: PHART, PO2ART, BLC4QDM, AKR4JWW, BEART, F3HPNUBD     Type & Screen (If Applicable):  No results found for: LABABO, LABRH    Drug/Infectious Status (If Applicable):  No results found for: HIV, HEPCAB    COVID-19 Screening (If Applicable):   Lab Results   Component Value Date    COVID19 Not Detected 01/26/2021         Anesthesia Evaluation Patient summary reviewed and Nursing notes reviewed no history of anesthetic complications:   Airway: Mallampati: II  TM distance: >3 FB   Neck ROM: full  Mouth opening: > = 3 FB Dental:    (+) edentulous      Pulmonary:   (+) COPD:                             Cardiovascular:    (+) hypertension:, angina:, CAD:, hyperlipidemia        Rhythm: regular  Rate: normal           Beta Blocker:  Dose within 24 Hrs      ROS comment: Normal left ventricle size, wall thickness, and systolic function with an estimated ejection fraction of 55-60%. No regional wall motion abnormalities are seen. Diastolic function is indeterminate. IVC size is dilated (>2.1cm) but collapses > 50% with respiration consistent   with mildly elevated RA pressure (8mmHg). Neuro/Psych:               GI/Hepatic/Renal:             Endo/Other:    (+) DiabetesType II DM, , .                 Abdominal:           Vascular:                                        Anesthesia Plan      general     ASA 3       Induction: intravenous. MIPS: Prophylactic antiemetics administered. Anesthetic plan and risks discussed with patient. Plan discussed with CRNA.                   Calvin Chavarria DO   2/1/2021

## 2021-02-02 VITALS
WEIGHT: 229 LBS | RESPIRATION RATE: 18 BRPM | DIASTOLIC BLOOD PRESSURE: 60 MMHG | TEMPERATURE: 97.6 F | HEIGHT: 70 IN | BODY MASS INDEX: 32.78 KG/M2 | OXYGEN SATURATION: 99 % | SYSTOLIC BLOOD PRESSURE: 92 MMHG | HEART RATE: 83 BPM

## 2021-02-02 LAB
ANION GAP SERPL CALCULATED.3IONS-SCNC: 8 MMOL/L (ref 3–16)
BUN BLDV-MCNC: 11 MG/DL (ref 7–20)
CALCIUM SERPL-MCNC: 9.3 MG/DL (ref 8.3–10.6)
CHLORIDE BLD-SCNC: 103 MMOL/L (ref 99–110)
CO2: 27 MMOL/L (ref 21–32)
CREAT SERPL-MCNC: 0.6 MG/DL (ref 0.8–1.3)
GFR AFRICAN AMERICAN: >60
GFR NON-AFRICAN AMERICAN: >60
GLUCOSE BLD-MCNC: 118 MG/DL (ref 70–99)
GLUCOSE BLD-MCNC: 130 MG/DL (ref 70–99)
GLUCOSE BLD-MCNC: 142 MG/DL (ref 70–99)
HCT VFR BLD CALC: 43.7 % (ref 40.5–52.5)
HEMOGLOBIN: 14.8 G/DL (ref 13.5–17.5)
MCH RBC QN AUTO: 31.7 PG (ref 26–34)
MCHC RBC AUTO-ENTMCNC: 33.8 G/DL (ref 31–36)
MCV RBC AUTO: 93.6 FL (ref 80–100)
PDW BLD-RTO: 14.4 % (ref 12.4–15.4)
PERFORMED ON: ABNORMAL
PERFORMED ON: ABNORMAL
PLATELET # BLD: 214 K/UL (ref 135–450)
PMV BLD AUTO: 8.5 FL (ref 5–10.5)
POTASSIUM SERPL-SCNC: 4.9 MMOL/L (ref 3.5–5.1)
RBC # BLD: 4.67 M/UL (ref 4.2–5.9)
SODIUM BLD-SCNC: 138 MMOL/L (ref 136–145)
WBC # BLD: 15.9 K/UL (ref 4–11)

## 2021-02-02 PROCEDURE — G0378 HOSPITAL OBSERVATION PER HR: HCPCS

## 2021-02-02 PROCEDURE — 51798 US URINE CAPACITY MEASURE: CPT

## 2021-02-02 PROCEDURE — 85027 COMPLETE CBC AUTOMATED: CPT

## 2021-02-02 PROCEDURE — 6370000000 HC RX 637 (ALT 250 FOR IP): Performed by: UROLOGY

## 2021-02-02 PROCEDURE — 36415 COLL VENOUS BLD VENIPUNCTURE: CPT

## 2021-02-02 PROCEDURE — 80048 BASIC METABOLIC PNL TOTAL CA: CPT

## 2021-02-02 RX ADMIN — INSULIN LISPRO 2 UNITS: 100 INJECTION, SOLUTION INTRAVENOUS; SUBCUTANEOUS at 12:14

## 2021-02-02 RX ADMIN — METOPROLOL TARTRATE 25 MG: 25 TABLET, FILM COATED ORAL at 08:50

## 2021-02-02 ASSESSMENT — PAIN SCALES - GENERAL
PAINLEVEL_OUTOF10: 0

## 2021-02-02 NOTE — PROGRESS NOTES
Pt alert and oriented. VSS. Pt sleeping most of the night. CBI in place with cherry output. Tolerating diet. Call light within reach.  Will continue to monitor

## 2021-02-02 NOTE — DISCHARGE SUMMARY
Urology Discharge Summary      Patient Identification  Conner Mcdowell is a 77 y.o. male. :  1955  Admit Date:  2021    Discharge date:  21                                   Disposition: home    Discharge Diagnoses:   Patient Active Problem List   Diagnosis    Chest pain    Tobacco abuse    Elevated blood pressure reading    Angina pectoris syndrome ([address]    Abnormal stress test    Coronary artery disease involving native coronary artery of native heart without angina pectoris    Mixed hyperlipidemia    Essential hypertension    Benign prostatic hyperplasia with nocturia    Hard, firm prostate    PSA elevation    Blood glucose elevated    Benign localized hyperplasia of prostate with urinary obstruction       Surgery: TURP    Activity:  no lifting or Strenuous exercise for 3-4 weeks    Condition on discharge: Stable    Follow-up: 1 month with Dr. Emili Becerra for post-op check    Hospital course: Patient was brought to [facility] for procedure stated above. Procedure went as planned with no complications. Patient was kept overnight for observation. Osborne catheter removed in the morning. Patient was discharged in stable condition. He is to follow up with Dr. Emili Becerra in 1 month for post-op check.      TRAY Gonzalez

## 2021-02-02 NOTE — PLAN OF CARE
Problem: Urinary Elimination:  Goal: Ability to achieve a balanced intake and output will improve  Description: Ability to achieve a balanced intake and output will improve  Note: CBI infusing.  Will continue to monitor

## 2021-02-02 NOTE — PROGRESS NOTES
4 Eyes Admission Assessment     I agree as the admission nurse that 2 RN's have performed a thorough Head to Toe Skin Assessment on the patient. ALL assessment sites listed below have been assessed on admission. Areas assessed by both nurses:   [x]   Head, Face, and Ears   [x]   Shoulders, Back, and Chest  [x]   Arms, Elbows, and Hands   [x]   Coccyx, Sacrum, and Ischium  [x]   Legs, Feet, and Heels        Does the Patient have Skin Breakdown?   No         Garcia Prevention initiated:  No   Wound Care Orders initiated:  No      [facility] nurse consulted for Pressure Injury (Stage 3,4, Unstageable, DTI, NWPT, and Complex wounds) or Garcia score 18 or lower:  No      Nurse 1 eSignature: Electronically signed by Bernadette Spann RN on 2/1/21 at 7:45 PM EST    **SHARE this note so that the co-signing nurse is able to place an eSignature**    Nurse 2 eSignature: {Esignature:[id number]}

## 2021-02-02 NOTE — CARE COORDINATION
Case Management Assessment            Discharge Note                    Date / Time of Note: 2/2/2021 10:18 AM                  Discharge Note Completed by: Aislinn Elena    Patient Name: Tristan Anderson   YOB: 1955  Diagnosis: Benign prostatic hyperplasia with lower urinary tract symptoms, symptom details unspecified [N40.1]  Benign localized hyperplasia of prostate with urinary obstruction [N40.1, N13.8]   Date / Time: 2/1/2021 11:57 AM    Current PCP: Karley Barnett MD  Clinic patient: No    Hospitalization in the last 30 days: No    Advance Directives:  Code Status: Prior  [place] DNR form completed and on chart: Not Indicated    Financial:  Payor: MEDICARE / Plan: MEDICARE PART A AND B / Product Type: *No Product type* /      Pharmacy:    [facility]  [phone] - F [phone]  [address] [place] [address]  Phone: [phone] Fax: [address] [phone] [facility] [phone]  [facility] [address]  Phone: [phone] Fax: [phone]    [id number] [address] [place] [address] [phone] - F [phone]  [address] [place] [address] Drive  [address] [place] [address]  Phone: [phone] Fax: [phone]      Assistance purchasing medications?: Potential Assistance Purchasing Medications: No  Assistance provided by Case Management: None at this time    Does patient want to participate in local refill/ meds to beds program?: No    Meds To Beds General Rules:  1. Can ONLY be done Monday- Friday between 8:30am-5pm  2. Prescription(s) must be in pharmacy by 3pm to be filled same day  3. Copy of patient's insurance/ prescription drug card and patient face sheet must be sent along with the prescription(s)  4. Cost of Rx cannot be added to hospital bill. If financial assistance is needed, please contact unit  or ;  Case manager or  CANNOT provide pharmacy voucher for patients co-pays  5. Patients can then  the prescription on their way out of the hospital at discharge, or pharmacy can deliver to the bedside if staff is available. (payment due at time of pick-up or delivery - cash, check, or card accepted)     Able to afford home medications/ co-pay costs: Yes    ADLS:  Current PT AM-PAC Score:   /24  Current OT AM-PAC Score:   /24      DISCHARGE Disposition: Home- No Services Needed    LOC at discharge: Not Applicable  [place] Completed: Not Indicated    Notification completed in HENS/PAS?:  Not Applicable    IMM Completed:   Not Indicated    Transportation:  Transportation PLAN for discharge: family   Mode of Transport: [employer] ordered at discharge: Not [address]: Not Applicable  Orders faxed: No    Durable Medical Equipment:  DME Provider: none  Equipment obtained during hospitalization:     Home Oxygen and Respiratory Equipment:  Oxygen needed at discharge?: Not [address]: Not Applicable  Portable tank available for discharge?: Not Indicated    Dialysis:  Dialysis patient: No    Dialysis Center:  Not Applicable      Additional CM Notes: Pt from home with wife will DC home no needs and F/U with Urology out pt    The Plan for Transition of Care is related to the following treatment goals of Benign prostatic hyperplasia with lower urinary tract symptoms, symptom details unspecified [N40.1]  Benign localized hyperplasia of prostate with urinary obstruction [N40.1, N13.8]    The Patient and/or patient representative Paresh Nielsen and his family were provided with a choice of provider and agrees with the discharge plan Yes    Freedom of choice list was provided with basic dialogue that supports the patient's individualized plan of care/goals and shares the quality data associated with the providers.  Not Indicated    Care Transitions patient: No    Hira Bridges Regina Merino, [facility]  Case Management Department  Ph: [phone]  Fax: [phone]

## 2021-02-02 NOTE — PROGRESS NOTES
Urology Attending Progress Note      Subjective: Patient with no  complaints, feeling fine. Vitals:  /74   Pulse 82   Temp 98.4 °F (36.9 °C) (Oral)   Resp 18   Ht 5' 10\" (1.778 m)   Wt 229 lb (103.9 kg)   SpO2 92%   BMI 32.86 kg/m²   Temp  Av.1 °F (35.6 °C)  Min: 95.4 °F (35.2 °C)  Max: 98.4 °F (36.9 °C)    Intake/Output Summary (Last 24 hours) at 2021 0928  Last data filed at 2021 0853  Gross per 24 hour   Intake 1767 ml   Output 5 ml   Net -258 ml       Exam: Osborne draining clear off CBI    Labs:  WBC:    Lab Results   Component Value Date    WBC 15.9 2021     Hemoglobin/Hematocrit:    Lab Results   Component Value Date    HGB 14.8 2021    HCT 43.7 2021     BMP:    Lab Results   Component Value Date     2021    K 4.9 2021     2021    CO2 27 2021    BUN 11 2021    LABALBU 3.9 2018    CREATININE 0.6 2021    CALCIUM 9.3 2021    GFRAA >60 2021    LABGLOM >60 2021     PT/INR:    Lab Results   Component Value Date    PROTIME 13.6 2021    INR 1.17 2021     PTT:    Lab Results   Component Value Date    APTT 30.6 2021   [APTT    Impression/Plan: 76 yo M with BPH POD#1 sp TURP. -Patient feeling good this AM. No  complaints  -Osborne draining clear off CBI. Osborne was removed for voiding trial.  -Patient afebrile, hgb 14.8.  -Bladder scan patient after next void, if PVR>450cc, call urology. -DC home today  -If patient cannot void, ok to replace catheter and DC. He will follow up with Dr. Ra Gonzalez in 1 month for post-op check.     TRAY Cross

## 2021-02-03 ENCOUNTER — CARE COORDINATION (OUTPATIENT)
Dept: CASE MANAGEMENT | Age: 66
End: 2021-02-03

## 2021-02-03 NOTE — CARE COORDINATION
bathroom, if available. Animals: You should restrict contact with pets and other animals while you are sick with COVID-19, just like you would around other people. Although there have not been reports of pets or other animals becoming sick with COVID-19, it is still recommended that people sick with COVID-19 limit contact with animals until more information is known about the virus. When possible, have another member of your household care for your animals while you are sick. If you are sick with COVID-19, avoid contact with your pet, including petting, snuggling, being kissed or licked, and sharing food. If you must care for your pet or be around animals while you are sick, wash your hands before and after you interact with pets and wear a facemask. Call ahead before visiting your doctor  If you have a medical appointment, call the healthcare provider and tell them that you have or may have COVID-19. This will help the healthcare providers office take steps to keep other people from getting infected or exposed. Wear a facemask  You should wear a facemask when you are around other people (e.g., sharing a room or vehicle) or pets and before you enter a healthcare providers office. If you are not able to wear a facemask (for example, because it causes trouble breathing), then people who live with you should not stay in the same room with you, or they should wear a facemask if they enter your room. Cover your coughs and sneezes  Cover your mouth and nose with a tissue when you cough or sneeze. Throw used tissues in a lined trash can. Immediately wash your hands with soap and water for at least 20 seconds or, if soap and water are not available, clean your hands with an alcohol-based hand  that contains at least 60% alcohol.   Clean your hands often  Wash your hands often with soap and water for at least 20 seconds, especially after blowing your nose, coughing, or sneezing; going to the bathroom; and before eating or preparing food. If soap and water are not readily available, use an alcohol-based hand  with at least 60% alcohol, covering all surfaces of your hands and rubbing them together until they feel dry. Soap and water are the best option if hands are visibly dirty. Avoid touching your eyes, nose, and mouth with unwashed hands. Avoid sharing personal household items  You should not share dishes, drinking glasses, cups, eating utensils, towels, or bedding with other people or pets in your home. After using these items, they should be washed thoroughly with soap and water. Clean all high-touch surfaces everyday  High touch surfaces include counters, tabletops, doorknobs, bathroom fixtures, toilets, phones, keyboards, tablets, and bedside tables. Also, clean any surfaces that may have blood, stool, or body fluids on them. Use a household cleaning spray or wipe, according to the label instructions. Labels contain instructions for safe and effective use of the cleaning product including precautions you should take when applying the product, such as wearing gloves and making sure you have good ventilation during use of the product. Monitor your symptoms  Seek prompt medical attention if your illness is worsening (e.g., difficulty breathing). Before seeking care, call your healthcare provider and tell them that you have, or are being evaluated for, COVID-19. Put on a facemask before you enter the facility. These steps will help the healthcare providers office to keep other people in the office or waiting room from getting infected or exposed. Ask your healthcare provider to call the local or state health department. Persons who are placed under active monitoring or facilitated self-monitoring should follow instructions provided by their local health department or occupational health professionals, as appropriate. When working with your local health department check their available hours.   If you have a medical emergency and need to call 911, notify the dispatch personnel that you have, or are being evaluated for COVID-19. If possible, put on a facemask before emergency medical services arrive. Discontinuing home isolation  Patients with confirmed COVID-19 should remain under home isolation precautions until the risk of secondary transmission to others is thought to be low. The decision to discontinue home isolation precautions should be made on a case-by-case basis, in consultation with healthcare providers and [facility] and local health departments. [facility] Transitions Initial Follow Up Call    Call within 2 business days of discharge: Yes    Patient: Cynthia Ward Patient : 1955   MRN: [id number]   Reason for Admission: COVID -19 Monitoring   Discharge Date: 21 RARS: No data recorded    [facility] Discharge [facility]       Complaint Diagnosis Description Type Department Provider    21  Benign prostatic hyperplasia with lower urinary tract symptoms, symptom details unspecified Admission (Discharged) [address] Anna Mcarthur MD           Spoke with: [facility]: [facility]     Non-face-to-face services provided:  Obtained and reviewed discharge summary and/or continuity of care documents  Education of patient/family/caregiver/guardian to support self-management-.   Assessment and support for treatment adherence and medication management-.    Care Transitions 24 Hour Call    Schedule Follow Up Appointment with PCP: Declined  Do you have any ongoing symptoms?: Yes  Patient-reported symptoms: Pain, Other (Comment: Blood in urine )  Do you have a copy of your discharge instructions?: Yes  Do you have all of your prescriptions and are they filled?: Yes  Have you been contacted by a [address]?: No  Have you scheduled your follow up appointment?: Yes  How are you going to get to your appointment?: Car - family or friend to transport  Were you discharged with any Home Care or Post Acute Services: No  Do you feel like you have everything you need to keep you well at home?: Yes  Care Transitions Interventions  No Identified Needs       Summary  CTN spoke with patient this am for initial 24 hour COVID -19 CTN call. Patient states he is doing well, having some slight pain, due to procedure with some slight blood in urine. Patient drinking plenty of fluids, instructed to continue to monitor urine for increased blood, clot's and decrease in output. No reports of any fever, chills, nausea, vomiting, chest pain, SOB or cough. Patient has all medications filled and in home, no other issues or concerns at this time. CTC provided education on s/s that require medical attention and when to seek medical attention. CTC advised Pt of use urgent care or physicians 24 hr access line if assistance is needed after hours or on the weekend. Pt denies any needs or concerns and is agreeable with additional calls.     Follow Up  Future Appointments   Date Time Provider Sommer Banda   12/10/2021 10:15 AM RUTHANN Capellan - ADAM Hagen RN

## 2021-02-03 NOTE — CARE COORDINATION
Date/Time:  2/3/2021 10:29 AM  Attempted to reach patient by telephone. Call within 2 business days of discharge: Yes Left HIPPA compliant message requesting a return call. Will attempt to reach patient again.       Thank Samira Mcbride RN  Care Transition Coordinator  Contact ONTGUV:[phone]

## 2021-02-10 NOTE — OP NOTE
[address]                                OPERATIVE REPORT    PATIENT NAME: Brandy Latif                   :        1955  MED REC NO:   [id number]                          ROOM:       5305  ACCOUNT NO:   [de-identified]                           ADMIT DATE: 2021  PROVIDER:     Suman Pena MD    DATE OF PROCEDURE:  2021    PREOPERATIVE DIAGNOSES:  Benign prostatic hypertrophy with lower urinary  tract symptoms. POSTOPERATIVE DIAGNOSES:  Benign prostatic hypertrophy with lower  urinary tract symptoms. OPERATION PERFORMED:  Transurethral resection of the prostate. SURGEON:  Suman Pena MD    ANESTHESIA:  General.    ANTIBIOTICS:  Ciprofloxacin 400 mg IV once. COMPLICATIONS:  None. ESTIMATED BLOOD LOSS:  150 mL. SPECIMEN:  Prostate tissue. INDICATION FOR PROCEDURE:  The patient is a very pleasant 59-year-old  gentleman with been having prostatic hypertrophy and obstructive lower  urinary tract symptoms. He has failed medical management and wishes to  proceed with transurethral resection of prostate. He understands the  risk of surgery including bleeding, infection, injury to the urinary  tract, persistent urinary retention, urinary incontinence, sexual side  effects, potential urethral stricture, and bladder neck contracture. He  wishes to proceed. DESCRIPTION OF THE PROCEDURE:  After informed consent, the patient was  taken to the operating room, placed under anesthesia. He was then  repositioned into dorsal lithotomy and the genitalia prepped and draped  in the usual sterile fashion. Thereafter, a continuous flow rigid  resectoscope was advanced per urethra and into the bladder. The urethra  and bladder were completely and symptomatically surveyed and noted to be  free of any suspicious or concerning lesions.   Bilateral ureteral orifices were noted to be in normal anatomic position along the trigonal  ridge. Prostate was noted to be enlarged and visually obstructing from  bilobar hypertrophy. I then began resection of the prostate using a  loop resection element working from the 6 o'clock position at the  bladder neck toward the verumontanum. I then extended the resection of  the right and left lateral lobes and then completed it with a resection  of the anterior tissue again at all times working from the bladder neck  to the level of the verumontanum and taking care at all times not to  extend the resection beyond the level of the verumontanum. After  extensive amount of resection with the loop device, I then switched over  to the button to smooth out the tissue and achieve hemostasis. I then  evacuated all the TURP chips from the bladder and repeat inspection of  prostatic fossa revealed good hemostasis without any evidence of  retained chips. The scope was removed, the patient was noted to have a  very strong expression stream.  A 22-[place]vian 3-way Osborne catheter was  advanced per urethra into the bladder with 30 mL of sterile water and  the balloon and the catheter placed on traction. Catheter was connected  to continuous bladder irrigation. The patient was taken to Recovery in  stable condition having tolerated the procedure well. POSTPROCEDURAL PLAN:  The patient will have a voiding trial tomorrow.         Kayley Hill MD    D: 02/09/2021 14:38:39       T: 02/09/2021 20:50:17     [email or web address]  Job#: [id number]     Doc#: [id number]    CC:

## 2021-02-19 ENCOUNTER — TELEPHONE (OUTPATIENT)
Dept: CARDIOLOGY CLINIC | Age: 66
End: 2021-02-19

## 2021-02-23 ENCOUNTER — CARE COORDINATION (OUTPATIENT)
Dept: CASE MANAGEMENT | Age: 66
End: 2021-02-23

## 2021-02-23 NOTE — CARE COORDINATION
You Patient resolved from the Care Transitions episode on 02/23/2021    Patient/family has been provided the following resources and education related to COVID-19:                         Signs, symptoms and red flags related to COVID-19            CDC exposure and quarantine guidelines            Con[facility]it exposure contact - [phone]            Contact for their local Department of Health                 Patient currently reports that the following symptoms have improved:  no new/worsening symptoms. Patient states he is doing well, reporting no complaints of any fever, chills, nausea, vomiting, chest pain, SOB or cough. Patient has follow up scheduled with PCP for late in the year. CTN instructed patient to contact PCP, to see if he needs to be seen sooner than scheduled appointment. No other issues or concerns at this time. No further outreach scheduled with this CTN/ACM. Episode of Care resolved. Patient has this CTN/ACM contact information if future needs arise.     Thank Konstantin Hernandez RN  Care Transition Coordinator  Contact WMXMDK:[phone]

## 2021-03-03 ENCOUNTER — TELEPHONE (OUTPATIENT)
Dept: FAMILY MEDICINE CLINIC | Age: 66
End: 2021-03-03

## 2021-03-03 RX ORDER — METFORMIN HYDROCHLORIDE 500 MG/1
TABLET, EXTENDED RELEASE ORAL
Qty: 60 TABLET | Refills: 0 | Status: SHIPPED | OUTPATIENT
Start: 2021-03-03 | End: 2021-03-08 | Stop reason: SDUPTHER

## 2021-03-03 RX ORDER — INSULIN DETEMIR 100 [IU]/ML
25 INJECTION, SOLUTION SUBCUTANEOUS NIGHTLY
Qty: 5 PEN | Refills: 0 | Status: SHIPPED | OUTPATIENT
Start: 2021-03-03 | End: 2021-03-08 | Stop reason: SDUPTHER

## 2021-03-05 ENCOUNTER — TELEPHONE (OUTPATIENT)
Dept: FAMILY MEDICINE CLINIC | Age: 66
End: 2021-03-05

## 2021-03-05 NOTE — TELEPHONE ENCOUNTER
LEVEMIR FLEXTOUCH 100 UNIT/ML injection pen    metFORMIN (GLUCOPHAGE-XR) 500 MG extended release tablet    [facility]:   Address: [address]  Phone: [phone]    Patient still has not received medications. Medications were sent to the wrong pharmacy. OV: 4/30/2019    Please advise.

## 2021-03-08 RX ORDER — INSULIN DETEMIR 100 [IU]/ML
25 INJECTION, SOLUTION SUBCUTANEOUS NIGHTLY
Qty: 5 PEN | Refills: 0 | Status: SHIPPED | OUTPATIENT
Start: 2021-03-08 | End: 2021-05-05

## 2021-03-08 RX ORDER — METFORMIN HYDROCHLORIDE 500 MG/1
TABLET, EXTENDED RELEASE ORAL
Qty: 60 TABLET | Refills: 0 | Status: SHIPPED | OUTPATIENT
Start: 2021-03-08 | End: 2021-03-26

## 2021-03-09 ENCOUNTER — TELEPHONE (OUTPATIENT)
Dept: FAMILY MEDICINE CLINIC | Age: 66
End: 2021-03-09

## 2021-03-09 DIAGNOSIS — E11.9 NEW ONSET TYPE 2 DIABETES MELLITUS (HCC): Primary | ICD-10-CM

## 2021-03-09 NOTE — TELEPHONE ENCOUNTER
LEVEMIR FLEXTOUCH 100 UNIT/ML injection pen    RX was $300 for the patient and this is too expensive. Patient would like something different (cheaper). Patient even suggested the pill form of the medication. [address] [place] [address] Tanner Rae [address] - F [phone]   [address] [place], [place] [address]   Phone:  [phone]  Fax:  [phone]    Please advise.

## 2021-03-09 NOTE — TELEPHONE ENCOUNTER
Pls verify if still our pt':has seen new pcp in 12/2020 at [facility]. If still our pt' advise him to check with pharmacy for more affordable insulin to levemir. There is no tablet form for levemir. F/u appt with me via VV advised.

## 2021-03-10 RX ORDER — INSULIN GLARGINE 100 [IU]/ML
25 INJECTION, SOLUTION SUBCUTANEOUS NIGHTLY
Qty: 5 PEN | Refills: 0 | Status: SHIPPED | OUTPATIENT
Start: 2021-03-10 | End: 2021-03-11 | Stop reason: SDUPTHER

## 2021-03-10 NOTE — TELEPHONE ENCOUNTER
Spoke with pharmacy. She states the only way for them to find out a cheaper alternative is if the rx is called in and can be ran through insurance that way.    Please advise

## 2021-03-10 NOTE — TELEPHONE ENCOUNTER
Basaglar at same dose e-scribed as alternative to levemir. Pharmacy will let him know the cost.  Last seen 6/2020:advise diabetes-med check appt within 1week via VV.

## 2021-03-10 NOTE — TELEPHONE ENCOUNTER
Phone & video visits are billed & coded the same as an in-person visit. Phone or video visit is the only option at this time due to COVID-19 & his chart shows there was a recent concern on 2/23/21 regarding COVID-19 sickness. If he only wishes to proceed with in-person visits then he may check with his insurance company for a new PCP offering this.

## 2021-03-10 NOTE — TELEPHONE ENCOUNTER
Pt called back and has been informed. Pt states he does not have a way of doing a VV. Per Dr. Guinevere Shone: telephone visit can be tried. I offered this option to the pt and he refused. He states he is able to see all his other doctors in person and is wanting to see Dr. Guinevere Shone in person. He states last telephone visit was over $200 for the patient.   Please advise

## 2021-03-10 NOTE — TELEPHONE ENCOUNTER
Pt informed and states he will think about it and call our office back if he chooses to go this route.

## 2021-03-11 DIAGNOSIS — E11.9 NEW ONSET TYPE 2 DIABETES MELLITUS (HCC): ICD-10-CM

## 2021-03-11 RX ORDER — INSULIN GLARGINE 100 [IU]/ML
25 INJECTION, SOLUTION SUBCUTANEOUS NIGHTLY
Qty: 5 PEN | Refills: 0 | Status: SHIPPED | OUTPATIENT
Start: 2021-03-11

## 2021-03-11 NOTE — TELEPHONE ENCOUNTER
Pt calling about previous message that he sent in that his Levemir is too expensive.  Pt needs a cheaper alternative called into his local pharmacy ([facility]) to last until his Rx comes from [facility]    Please advise    Last OV:  6-17-20

## 2021-03-15 ENCOUNTER — TELEPHONE (OUTPATIENT)
Dept: FAMILY MEDICINE CLINIC | Age: 66
End: 2021-03-15

## 2021-03-15 DIAGNOSIS — E11.9 NEW ONSET TYPE 2 DIABETES MELLITUS (HCC): Primary | ICD-10-CM

## 2021-03-15 RX ORDER — INSULIN GLARGINE 100 [IU]/ML
INJECTION, SOLUTION SUBCUTANEOUS
Qty: 5 PEN | Refills: 0 | Status: SHIPPED | OUTPATIENT
Start: 2021-03-15

## 2021-03-15 NOTE — TELEPHONE ENCOUNTER
LEVEMIR FLEXTOUCH 100 UNIT/ML injection pen    Can the patient use vials since the medication is too expensive? Please advise.      OV: 6/17/2020

## 2021-03-15 NOTE — TELEPHONE ENCOUNTER
Spoke with pharmacist. She states the [address] was $276 and Armando was $280. Not able to use coupons since he is on Medicare part D. Unable to give us any alternatives due to pharmacies going to immediate billing, they would need a rx to be sent in first before they can tell us pricing and coverage.   Please advise

## 2021-03-26 RX ORDER — ISOSORBIDE MONONITRATE 30 MG/1
TABLET, EXTENDED RELEASE ORAL
Qty: 30 TABLET | Refills: 11 | Status: SHIPPED | OUTPATIENT
Start: 2021-03-26 | End: 2021-08-26 | Stop reason: SDUPTHER

## 2021-04-05 RX ORDER — METFORMIN HYDROCHLORIDE 500 MG/1
TABLET, EXTENDED RELEASE ORAL
Qty: 60 TABLET | Refills: 0 | Status: SHIPPED | OUTPATIENT
Start: 2021-04-05

## 2021-08-02 DIAGNOSIS — E78.2 MIXED HYPERLIPIDEMIA: ICD-10-CM

## 2021-08-02 LAB
ALBUMIN SERPL-MCNC: 3.9 G/DL (ref 3.4–5)
ALP BLD-CCNC: 55 U/L (ref 40–129)
ALT SERPL-CCNC: 16 U/L (ref 10–40)
AST SERPL-CCNC: 42 U/L (ref 15–37)
BILIRUB SERPL-MCNC: 0.5 MG/DL (ref 0–1)
BILIRUBIN DIRECT: <0.2 MG/DL (ref 0–0.3)
BILIRUBIN, INDIRECT: ABNORMAL MG/DL (ref 0–1)
CHOLESTEROL, TOTAL: 84 MG/DL (ref 0–199)
HDLC SERPL-MCNC: 23 MG/DL (ref 40–60)
LDL CHOLESTEROL CALCULATED: 24 MG/DL
TOTAL PROTEIN: 7.3 G/DL (ref 6.4–8.2)
TRIGL SERPL-MCNC: 185 MG/DL (ref 0–150)
VLDLC SERPL CALC-MCNC: 37 MG/DL

## 2021-08-03 ENCOUNTER — TELEPHONE (OUTPATIENT)
Dept: CARDIOLOGY CLINIC | Age: 66
End: 2021-08-03

## 2021-08-03 NOTE — TELEPHONE ENCOUNTER
----- Message from Hermelinda Sharpe MD sent at 8/3/2021  8:20 AM EDT -----  Let patient know their lab test is improved, continue current meds, no new orders or changes at this time. Thanks.

## 2021-08-26 RX ORDER — ISOSORBIDE MONONITRATE 30 MG/1
30 TABLET, EXTENDED RELEASE ORAL DAILY
Qty: 30 TABLET | Refills: 11 | Status: SHIPPED | OUTPATIENT
Start: 2021-08-26 | End: 2021-09-20 | Stop reason: SDUPTHER

## 2021-08-31 DIAGNOSIS — I25.10 CORONARY ARTERY DISEASE INVOLVING NATIVE CORONARY ARTERY OF NATIVE HEART WITHOUT ANGINA PECTORIS: ICD-10-CM

## 2021-09-17 DIAGNOSIS — E78.2 MIXED HYPERLIPIDEMIA: ICD-10-CM

## 2021-09-17 DIAGNOSIS — I25.10 CORONARY ARTERY DISEASE INVOLVING NATIVE CORONARY ARTERY OF NATIVE HEART WITHOUT ANGINA PECTORIS: ICD-10-CM

## 2021-09-17 NOTE — TELEPHONE ENCOUNTER
Pt is requesting a refill on Metoprolol, isosorbide mononitrate, and atorvastatin.  Please send to [facility], [address]   Phone:  [phone]  Fax:  [phone]

## 2021-09-20 RX ORDER — ISOSORBIDE MONONITRATE 30 MG/1
30 TABLET, EXTENDED RELEASE ORAL DAILY
Qty: 90 TABLET | Refills: 3 | Status: SHIPPED | OUTPATIENT
Start: 2021-09-20

## 2021-09-20 RX ORDER — ATORVASTATIN CALCIUM 40 MG/1
TABLET, FILM COATED ORAL
Qty: 90 TABLET | Refills: 3 | Status: SHIPPED | OUTPATIENT
Start: 2021-09-20

## 2023-01-22 NOTE — PROGRESS NOTES
Subjective:      Patient ID: Patti Herbert is a 58 y.o. male. HPI  Results for Anayeli Rodrigues (MRN [id number]) as of 1/3/2018 10:54   Ref. Range 11/8/2017 13:44 11/13/2017 15:03   OCCULT BLOOD FECAL Unknown negative    Color, UA Latest Ref Range: Straw/Yellow   DK YELLOW   Clarity, UA Latest Ref Range: Clear   CLOUDY (A)   Glucose, UA Latest Ref Range: Negative mg/dL  Negative   Bilirubin, Urine Latest Ref Range: Negative   Negative   Ketones, Urine Latest Ref Range: Negative mg/dL  TRACE (A)   Specific [place], UA Latest Ref Range: 1.005 - 1.030   1.023   Blood, Urine Latest Ref Range: Negative   Negative   pH, UA Latest Ref Range: 5.0 - 8.0   6.0   Protein, UA Latest Ref Range: Negative mg/dL  Negative   Urobilinogen, Urine Latest Ref Range: <2.0 E.U./dL  1.0   Nitrite, Urine Latest Ref Range: Negative   Negative   Leukocyte Esterase, Urine Latest Ref Range: Negative   SMALL (A)   Urine Type Unknown  Clean catch   Hyaline Casts, UA Latest Ref Range: 0 - 8 /LPF  2   WBC, UA Latest Ref Range: 0 - 5 /HPF  10 (H)   RBC, UA Latest Ref Range: 0 - 4 /HPF  5 (H)   Epi Cells Latest Ref Range: 0 - 5 /HPF  9 (H)   Bacteria, UA Latest Units: /HPF  1+ (A)   Crystals Latest Units: /HPF  1+ Ca. Oxalate (A)   Microscopic Examination Unknown  YES   Urine Culture, Routine Unknown  No growth at 18-3... F/u to BPH:reports doing well with flomax. No med side effects reported. Associated w/occasional dribbling:this remains better. Worsened(aggravated) by nothing new. Improves with med:flomax. PSA:lab pending:pt' has lab order. Urology appt(hard firm prostate):pending:pt' states he has been contacted by urology but not schedule yet. States he will be calling today to schedule since the new year insurance has started. Fhx or personal hx of prostate cancer:no.   Denies urinary hisitancy/weak stream/daytime urinary frequency/dysuria/urgency/fever/chills/low back pain/n-v.    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 Advised to go to local ER or call 911 for any worrisome signs/sx. Threatened

## 2023-02-03 ENCOUNTER — TELEPHONE (OUTPATIENT)
Dept: CARDIOLOGY CLINIC | Age: 68
End: 2023-02-03

## 2023-02-03 NOTE — TELEPHONE ENCOUNTER
[facility], however, if pt needs sooner, can look for NP appt as well to faciliate things. Additionally, can offer pt another provider if that would be helpful as well. Thank you.

## 2023-02-03 NOTE — TELEPHONE ENCOUNTER
CARDIAC CLEARANCE REQUEST    What type of procedure are you having:  Right upper lobe-lung surgery  Are you taking any blood thinners:  Asprin hold 7 days prior  Type on anesthesia:  gerneral  When is your procedure scheduled for:  02/20/2023  What physician is performing your procedure:  Dr. Tatum Course  Phone Number:  [phone]-OGKristy Tim's nurse Elvia Garcia number  Fax number to send the letter:  [phone]    Last ov 12/11/2020 hilary Bonilla does not have availability prior ro pts surgery. Please advise.

## 2023-02-09 NOTE — PROGRESS NOTES
[address]   CARDIAC EVALUATION NOTE  [phone]      PCP:  No primary care provider on file. Reason for Consultation/Chief Complaint: Cardiac Clearance, CAD    Subjective   History of Present Illness:  Marlene Jackson is a 76 y.o. patient with with a history of CAD, COPD, HTN and HLD who presents for follow up. She is a previous pt of Dr. Davion Leone. His stress test from 06/08/16 showed a small mildly reversible apical lateral defect. He underwent a cardiac cath on 06/10/16 which showed 70% ostial medium-caliber first diagonal branch. 50% mid-left anterior descending. 10/2/20 his weight is down 40 lbs with diet changes. He reports he feels well overall. He denies CP, SOB, dizziness or syncope. His BP is low on exam.    12/11/20 OV with Mark Lawson NP for follow up of CAD. He reported shortness of breath with strenuous exertion. He denied chest pain, palpitations, and dizziness. Today he presents for cardiac clearance for Right upper lobe-lung surgery and reports that he has been doing okay overall heart wise. His lung procedure is scheduled for 2/20/23 at [facility]. He reports that he is not very active and does use a motorized cart to get around in the store. He denies cheat pain dizziness syncope or edema. Notes sob. Past Medical History:   has a past medical history of Benign prostatic hyperplasia with nocturia, CAD (coronary artery disease), COPD (chronic obstructive pulmonary disease) ([facility] [address] Diabetes mellitus ([facility] [address] H/O colonoscopy, HTN (hypertension), Hyperlipidemia, PSA elevation, and Smoker. Surgical History:   has a past surgical history that includes [place] tooth extraction; Colonoscopy; Coronary angioplasty; and TURP (N/A, 2/1/2021). Social History:   reports that he has been smoking cigarettes. He has a 30.00 pack-year smoking history. He has never used smokeless tobacco. He reports that he does not drink alcohol and does not use drugs.      Family History:  family history includes Heart Disease in his brother; No Known Problems in his father, maternal grandfather, maternal grandmother, mother, paternal grandfather, paternal grandmother, and sister. Home Medications:  Were reviewed and are listed in nursing record and/or below  Prior to Admission medications    Medication Sig Start Date End Date Taking?  Authorizing Provider   hydroCHLOROthiazide (MICROZIDE) 12.5 MG capsule Take 12.5 mg by mouth daily   Yes Historical Provider, MD   lisinopril (PRINIVIL;ZESTRIL) 2.5 MG tablet Take 2.5 mg by mouth daily 1/5/23  Yes Historical Provider, MD   atorvastatin (LIPITOR) 40 MG tablet TAKE 1 TABLET BY MOUTH ONce nightly 9/20/21  Yes RUTHANN Mao CNP   metFORMIN (GLUCOPHAGE-XR) 500 MG extended release tablet TAKE 1 TABLET BY MOUTH TWO TIMES A DAY 4/5/21  Yes Alexandro Atwood MD   tamsulosin (FLOMAX) 0.4 MG capsule TAKE 1 CAPSULE BY MOUTH ONCE DAILY 3/26/21  Yes Maddy Viera MD   [address] by Does not apply route Duration:5years 11/29/17  Yes Alexandro Atwood MD   aspirin 81 MG chewable tablet Take 1 tablet by mouth daily 6/10/16  Yes Dennise Mejia MD   isosorbide mononitrate (IMDUR) 30 MG extended release tablet Take 1 tablet by mouth daily  Patient not taking: Reported on 2/10/2023 9/20/21   RUTHANN Mao CNP   metoprolol tartrate (LOPRESSOR) 25 MG tablet Take 1 tablet by mouth 2 times daily  Patient not taking: Reported on 2/10/2023 8/31/21   RUTHANN Mao CNP   LEVEMIR FLEXTOUCH 100 UNIT/ML injection pen INJECT 25 UNITS SUBCUTANEOUSLY NIGHTLY  Patient not taking: Reported on 2/10/2023 5/5/21   Alexandro Atwood MD   insulin glargine (LANTUS SOLOSTAR) 100 UNIT/ML injection pen Inject 25 Units into the skin nightly  Patient not taking: Reported on 2/10/2023 3/15/21   Alexandro Atwood MD   insulin glargine (BASAGLAR KWIKPEN) 100 UNIT/ML injection pen Inject 25 Units into the skin nightly Inject 25 Units into the skin nightly  Patient not taking: Reported on 2/10/2023 3/11/21   Sadie Colmenares MD   INSULIN ASPART SC Inject into the skin UNKNOWN INSULIN  Patient not taking: Reported on 2/10/2023    Historical Provider, MD          Allergies:  Valium     Review of Systems:   A 14 point review of symptoms completed. Pertinent positives identified in the HPI, all other review of symptoms negative as below.       Objective   PHYSICAL EXAM:    Vitals:    02/10/23 1208   BP: 106/64   Pulse: 87   SpO2: 95%      Weight: 266 lb 8 oz (120.9 kg)         General Appearance:  Alert, cooperative, no distress, appears stated age   Head:  Normocephalic, without obvious abnormality, atraumatic   Eyes:  PERRL, conjunctiva/corneas clear   Nose: Nares normal, no drainage or sinus tenderness   Throat: Lips, mucosa, and tongue normal   Neck: Supple, symmetrical, trachea midline, no adenopathy, thyroid: not enlarged, symmetric, no tenderness/mass/nodules, no carotid bruit or JVD   Lungs:   Clear to auscultation bilaterally, respirations unlabored   Chest Wall:  No deformity or tenderness   Heart:  Regular rate and rhythm, S1, S2 normal, no murmur, rub or gallop   Abdomen:   Soft, non-tender, bowel sounds active all four quadrants,  no masses, no organomegaly   Extremities: Extremities normal, atraumatic, no cyanosis or edema   Pulses: 2+ and symmetric   Skin: Skin color, texture, turgor normal, no rashes or lesions   Pysch: Normal mood and affect   Neurologic: Normal gross motor and sensory exam.         Labs   CBC:   Lab Results   Component Value Date/Time    WBC 15.9 02/02/2021 05:10 AM    RBC 4.67 02/02/2021 05:10 AM    HGB 14.8 02/02/2021 05:10 AM    HCT 43.7 02/02/2021 05:10 AM    MCV 93.6 02/02/2021 05:10 AM    RDW 14.4 02/02/2021 05:10 AM     02/02/2021 05:10 AM     CMP:  Lab Results   Component Value Date/Time     03/07/2022 10:13 AM    K 4.5 03/07/2022 10:13 AM    CL 98 03/07/2022 10:13 AM    CO2 24 03/07/2022 10:13 AM    BUN 7 03/07/2022 10:13 AM    CREATININE 0.8 03/07/2022 10:13 AM    GFRAA >60 03/07/2022 10:13 AM    AGRATIO 1.1 03/07/2022 10:13 AM    LABGLOM >60 03/07/2022 10:13 AM    GLUCOSE 147 03/07/2022 10:13 AM    PROT 7.5 03/07/2022 10:13 AM    CALCIUM 9.2 03/07/2022 10:13 AM    BILITOT 0.6 03/07/2022 10:13 AM    ALKPHOS 125 03/07/2022 10:13 AM    AST 26 03/07/2022 10:13 AM    ALT 17 03/07/2022 10:13 AM     PT/INR:  No results found for: PTINR  HgBA1c:No results found for: LABA1C  Lab Results   Component Value Date    TROPONINI <0.01 06/08/2016         Cardiac Data     Last EKG: 10/02/20  SR HR 74, baseline artifact     Today, nsr possible inferior infarct     Echo:1/23/21  Summary   Normal left ventricle size, wall thickness, and systolic function with an   estimated ejection fraction of 55-60%. No regional wall motion abnormalities   are seen. Diastolic function is indeterminate. IVC size is dilated (>2.1cm) but collapses > 50% with respiration consistent   with mildly elevated RA pressure (8mmHg). Stress Test: 06/08/16  Summary  There is a small mildly reversible apical lateral defect from soft tissue  attenuation versus ischemia. Correlate clinically. There are no regional wall motion abnormalities. Normal left ventricular systolic function with ejection fraction of 65 %. Abnormal low risk myocardial perfusion study. NM Stress Test: 1/23/21  Summary  There is normal isotope uptake at stress and rest. There is no evidence of  myocardial ischemia or scar. Normal LV size and systolic function. Left ventricular ejection fraction of 70 %. There are no regional wall motion abnormalities. Overall findings represent a low risk scan. Cath: 06/10/16     IMPRESSION:  1.  A 70% ostial medium-caliber first diagonal branch. 2.  A 50% mid-left anterior descending. 3.  Normal left ventricular systolic function with ejection fraction of 55%.   3.  Mildly elevated left ventricular end-diastolic pressure, 17 mmHg, which  is from uncontrolled hypertension. RECOMMENDATION:  Patient has branch vessel disease, which is also in the  territory of his abnormal stress test.  Will treat the diagonal branch  disease medically as attempts to revascularize the branch may jeopardize the  main LAD trunk. His blood pressure was also elevated on presentation to the  hospital, and  needs optimal therapy for the blood pressure. Will start him   on Imdur 30 mg daily as well as Lopressor 25 mg twice a day for his angina. He will continue with the Norvasc for the blood pressure. He will continue  low-dose aspirin as well as moderate-dose Lipitor for coronary  artery   disease. CTPA: 06/08/16  IMPRESSION: 1. Negative for pulmonary embolus. 2. Mild central airways disease. 3. Nonspecific right hilar and mediastinal lymph node enlargement. Studies:     Assessment      1. Coronary artery disease involving native coronary artery of native heart without angina pectoris    2. Mixed hyperlipidemia    3. Essential hypertension    4. Chest pain, unspecified type    5. Angina pectoris syndrome ([address]    6. Preoperative clearance    7. Angina pectoris, unspecified ([address]                 Plan   ECHO to assess heart size and function, decrs functional status, sob, abnl ekg   Stress Komal scan to assess for any potential blockages of the arteries inside the heart, decrs functional status, sob, abnl ekg   Call [phone] to schedule echo and stress test Komal  Cardiac Clearance is pending further cardiac testing results  Continue taking all the same medications for chronic heart conditions above   Follow up with Nurse Practitioner 3 months        Scribe's attestation: This note was scribed in the presence of Dr. Maddie Wyatt MD   by Franki Luevano LPN        Thank you for allowing us to participate in the care of Cheryle Gavel. Please call me with any questions [phone].     Maddie Wyatt MD, [address] [facility] [place]  [phone] [facility]  [phone] [address] [place] [address]  2/10/2023 1:07 PM    I will address the patient's cardiac risk factors and adjusted pharmacologic treatment as needed. In addition, I have reinforced the need for patient directed risk factor modification. Tobacco use was discussed with the patient and educated on the negative effects and was asked not to use. All questions and concerns were addressed to the patient/family. Alternatives to my treatment were discussed. I, Dr Carrie Morin, personally performed the services described in this documentation, as scribed by the above signed scribe in my presence. It is both accurate and complete to my knowledge. I agree with the details independently gathered by the clinical support staff and the scribed note accurately describes my personal service to the patient.

## 2023-02-10 ENCOUNTER — OFFICE VISIT (OUTPATIENT)
Dept: CARDIOLOGY CLINIC | Age: 68
End: 2023-02-10
Payer: MEDICARE

## 2023-02-10 VITALS
BODY MASS INDEX: 38.15 KG/M2 | HEIGHT: 70 IN | HEART RATE: 87 BPM | WEIGHT: 266.5 LBS | DIASTOLIC BLOOD PRESSURE: 64 MMHG | OXYGEN SATURATION: 95 % | SYSTOLIC BLOOD PRESSURE: 106 MMHG

## 2023-02-10 DIAGNOSIS — I20.9 ANGINA PECTORIS, UNSPECIFIED (HCC): ICD-10-CM

## 2023-02-10 DIAGNOSIS — R07.9 CHEST PAIN, UNSPECIFIED TYPE: ICD-10-CM

## 2023-02-10 DIAGNOSIS — Z01.818 PREOPERATIVE CLEARANCE: ICD-10-CM

## 2023-02-10 DIAGNOSIS — I25.10 CORONARY ARTERY DISEASE INVOLVING NATIVE CORONARY ARTERY OF NATIVE HEART WITHOUT ANGINA PECTORIS: Primary | ICD-10-CM

## 2023-02-10 DIAGNOSIS — I10 ESSENTIAL HYPERTENSION: ICD-10-CM

## 2023-02-10 DIAGNOSIS — I20.9 ANGINA PECTORIS SYNDROME (HCC): ICD-10-CM

## 2023-02-10 DIAGNOSIS — E78.2 MIXED HYPERLIPIDEMIA: ICD-10-CM

## 2023-02-10 PROCEDURE — 99214 OFFICE O/P EST MOD 30 MIN: CPT | Performed by: INTERNAL MEDICINE

## 2023-02-10 PROCEDURE — 1123F ACP DISCUSS/DSCN MKR DOCD: CPT | Performed by: INTERNAL MEDICINE

## 2023-02-10 PROCEDURE — 3074F SYST BP LT 130 MM HG: CPT | Performed by: INTERNAL MEDICINE

## 2023-02-10 PROCEDURE — 93000 ELECTROCARDIOGRAM COMPLETE: CPT | Performed by: INTERNAL MEDICINE

## 2023-02-10 PROCEDURE — 3078F DIAST BP <80 MM HG: CPT | Performed by: INTERNAL MEDICINE

## 2023-02-10 RX ORDER — HYDROCHLOROTHIAZIDE 12.5 MG/1
12.5 CAPSULE, GELATIN COATED ORAL DAILY
COMMUNITY

## 2023-02-10 RX ORDER — LISINOPRIL 2.5 MG/1
2.5 TABLET ORAL DAILY
COMMUNITY
Start: 2023-01-05

## 2023-02-10 NOTE — PATIENT INSTRUCTIONS
Plan   ECHO to assess heart size and function  Stress Komal scan to assess for any potential blockages of the arteries inside the heart  Call [phone] to schedule echo and stress test Komal  Cardiac Clearance is pending further cardiac testing results  Continue taking all the same medications for chronic heart conditions above   Follow up with Nurse Practitioner 3 months

## 2023-02-15 ENCOUNTER — HOSPITAL ENCOUNTER (OUTPATIENT)
Dept: CARDIOLOGY | Age: 68
Discharge: HOME | End: 2023-02-15
Payer: MEDICARE

## 2023-02-15 DIAGNOSIS — I20.9 ANGINA PECTORIS, UNSPECIFIED (HCC): ICD-10-CM

## 2023-02-15 DIAGNOSIS — Z01.818 PREOPERATIVE CLEARANCE: ICD-10-CM

## 2023-02-15 LAB
LV EF: 50 %
LV EF: 55 %
LVEF MODALITY: NORMAL
LVEF MODALITY: NORMAL

## 2023-02-15 PROCEDURE — 93017 CV STRESS TEST TRACING ONLY: CPT

## 2023-02-15 PROCEDURE — 6360000002 HC RX W HCPCS: Performed by: INTERNAL MEDICINE

## 2023-02-15 PROCEDURE — A9502 TC99M TETROFOSMIN: HCPCS | Performed by: INTERNAL MEDICINE

## 2023-02-15 PROCEDURE — 78452 HT MUSCLE IMAGE SPECT MULT: CPT

## 2023-02-15 PROCEDURE — 3430000000 HC RX DIAGNOSTIC RADIOPHARMACEUTICAL: Performed by: INTERNAL MEDICINE

## 2023-02-15 PROCEDURE — 93306 TTE W/DOPPLER COMPLETE: CPT

## 2023-02-15 RX ADMIN — TETROFOSMIN 31.8 MILLICURIE: 1.38 INJECTION, POWDER, LYOPHILIZED, FOR SOLUTION INTRAVENOUS at 08:50

## 2023-02-15 RX ADMIN — TETROFOSMIN 11 MILLICURIE: 1.38 INJECTION, POWDER, LYOPHILIZED, FOR SOLUTION INTRAVENOUS at 07:45

## 2023-02-15 RX ADMIN — REGADENOSON 0.4 MG: 0.08 INJECTION, SOLUTION INTRAVENOUS at 08:50

## 2023-02-15 NOTE — PROGRESS NOTES
A Lexiscan Myoview stress test was completed on this Pt. The Pt had some asymptomatic hypotension post test however improved with caffeine beverage. Pt tolerated the test well.

## 2023-02-16 ENCOUNTER — TELEPHONE (OUTPATIENT)
Dept: CARDIOLOGY CLINIC | Age: 68
End: 2023-02-16

## 2023-02-16 DIAGNOSIS — I71.40 ABDOMINAL ANEURYSM: Primary | ICD-10-CM

## 2023-02-16 NOTE — TELEPHONE ENCOUNTER
Rupa Human with Dr Agapito Ballard office wanting to get cardiac clearance letter faxed over [phone]

## 2023-02-16 NOTE — LETTER
[address] [place] [id number]  Phone: [phone]  Fax: [phone]    Sanjuan Fleischer, MD    Re:Glen Almanzar  1955 February 17, 2023    To whom it may concern,     Jorge Albertosantosh Martelg is considered an intermediate risk for planned procedure. If you have any further questions please contact [phone].        Sincerely,        Sanjuan Fleischer, MD

## 2023-02-16 NOTE — TELEPHONE ENCOUNTER
Request for clearance for ROBOTIC RIGHT UPPER LOBE BIOPSY, POSSIBLE SEGMENTECTOMY, POSSIBLE LOBECTOMY WITH LYMPH NODE DISSECTION (Right: Chest)    Pt is on ASA 81 mg daily. Please advise.

## 2023-02-16 NOTE — TELEPHONE ENCOUNTER
Let patient know their stress test is abnormal but overall lower risk, as such patient can proceed with right lung surgery. Let him know that he also had a abdominal aneurysm detected and it would be advisable that he follows up with vascular surgery for this and lets put in referral to Dr. Lora Alba for that. Let him know that depending on how his surgery goes, he may still end up needing a cardiac catheterization somewhere down the road but would defer that at present time and lets arrange f/u w/ NP for future f/u appt. Would indicate that he Patient is at acceptable (intermediate) cv risk for planned procedure/surgery. thanks.

## 2023-02-17 ENCOUNTER — TELEPHONE (OUTPATIENT)
Dept: CARDIOLOGY CLINIC | Age: 68
End: 2023-02-17

## 2023-02-17 DIAGNOSIS — I71.40 ABDOMINAL AORTIC ANEURYSM (AAA) WITHOUT RUPTURE, UNSPECIFIED PART: Primary | ICD-10-CM

## 2023-02-17 NOTE — TELEPHONE ENCOUNTER
Qian Dates from [address] called and stated she needs clearance letter sent to fax # [phone]

## 2023-02-17 NOTE — TELEPHONE ENCOUNTER
----- Message from Shae Ely MD sent at 2/15/2023  9:57 AM EST -----  Let patient know echo test shows normal heart function, no new orders or changes at this time. Thanks.

## 2023-02-17 NOTE — TELEPHONE ENCOUNTER
----- Message from Nelli Castillo MD sent at 2/15/2023  3:51 PM EST -----  Let patient know their stress test is abnormal but overall lower risk, as such patient can proceed with right lung surgery. Let him know that he also had a abdominal aneurysm detected and it would be advisable that he follows up with vascular surgery for this and lets put in referral to Dr. Jonny Van for that. Let him know that depending on how his surgery goes, he may still end up needing a cardiac catheterization somewhere down the road but would defer that at present time and lets arrange f/u w/ NP for future f/u appt. Would indicate that he Patient is at acceptable (intermediate) cv risk for planned procedure/surgery. thanks.

## 2023-02-17 NOTE — LETTER
[address] [address] [employer] [place] [id number]  Phone: [phone]  Fax: [phone]    Malcolm Lu MD        February 17, 2023     Patient: Graham Perla   YOB: 1955   Date of Visit: 2/17/2023       To Whom It May Concern: It is my medical opinion that Nikki Tesfaye 1955 is at acceptable intermediate cardiovascular risk for planned procedure/surgery. If you have any questions or concerns, please don't hesitate to call.     Sincerely,        Malcolm Lu MD

## 2023-02-17 NOTE — TELEPHONE ENCOUNTER
Called and spoke with patient. Relayed SRJ results and instructions, he VU. Provided  contact information to schedule appointment. Appointment with DD NP scheduled for 05/12/23 at [facility], patient VU to time,date, and location of appt. Letter created. Faxed to Selvin Leblanc.  Scanned into media

## 2023-02-17 NOTE — TELEPHONE ENCOUNTER
Spoke with the patient and relayed SRJ message. Letter created and faxed. Referral to Dr. Bryan Bautista placed and patient aware. Front - the patient will need a follow up appointment with an NP per SRJ.

## 2023-02-23 ENCOUNTER — TELEPHONE (OUTPATIENT)
Dept: VASCULAR SURGERY | Age: 68
End: 2023-02-23

## 2023-02-23 DIAGNOSIS — I71.43 INFRARENAL ABDOMINAL AORTIC ANEURYSM (AAA) WITHOUT RUPTURE: Primary | ICD-10-CM

## 2023-02-23 DIAGNOSIS — I10 ESSENTIAL HYPERTENSION: ICD-10-CM

## 2023-02-23 NOTE — TELEPHONE ENCOUNTER
Called patient and spoke with spouse regarding patient getting a cta abdomin/pelvis  on March 8 at 8:00am. She stated that he is in [facility] and would call back. fermín

## 2023-03-13 ENCOUNTER — HOSPITAL ENCOUNTER (OUTPATIENT)
Dept: CT IMAGING | Age: 68
Discharge: HOME | End: 2023-03-13
Payer: MEDICARE

## 2023-03-13 ENCOUNTER — HOSPITAL ENCOUNTER (OUTPATIENT)
Age: 68
Discharge: HOME | End: 2023-03-13
Payer: MEDICARE

## 2023-03-13 DIAGNOSIS — I10 ESSENTIAL HYPERTENSION: ICD-10-CM

## 2023-03-13 DIAGNOSIS — I71.43 INFRARENAL ABDOMINAL AORTIC ANEURYSM (AAA) WITHOUT RUPTURE: ICD-10-CM

## 2023-03-13 LAB
ANION GAP SERPL CALCULATED.3IONS-SCNC: 9 MMOL/L (ref 3–16)
BUN BLDV-MCNC: 8 MG/DL (ref 7–20)
CALCIUM SERPL-MCNC: 9.4 MG/DL (ref 8.3–10.6)
CHLORIDE BLD-SCNC: 102 MMOL/L (ref 99–110)
CO2: 30 MMOL/L (ref 21–32)
CREAT SERPL-MCNC: 0.7 MG/DL (ref 0.8–1.3)
GFR SERPL CREATININE-BSD FRML MDRD: >60 ML/MIN/{1.73_M2}
GLUCOSE BLD-MCNC: 113 MG/DL (ref 70–99)
POTASSIUM SERPL-SCNC: 3.7 MMOL/L (ref 3.5–5.1)
SODIUM BLD-SCNC: 141 MMOL/L (ref 136–145)

## 2023-03-13 PROCEDURE — 36415 COLL VENOUS BLD VENIPUNCTURE: CPT

## 2023-03-13 PROCEDURE — 80048 BASIC METABOLIC PNL TOTAL CA: CPT

## 2023-03-13 PROCEDURE — 74174 CTA ABD&PLVS W/CONTRAST: CPT

## 2023-03-13 PROCEDURE — 6360000004 HC RX CONTRAST MEDICATION: Performed by: SURGERY

## 2023-03-13 RX ADMIN — IOPAMIDOL 75 ML: 755 INJECTION, SOLUTION INTRAVENOUS at 14:57

## 2023-03-17 ENCOUNTER — OFFICE VISIT (OUTPATIENT)
Dept: VASCULAR SURGERY | Age: 68
End: 2023-03-17

## 2023-03-17 VITALS
DIASTOLIC BLOOD PRESSURE: 58 MMHG | WEIGHT: 266 LBS | BODY MASS INDEX: 38.08 KG/M2 | HEIGHT: 70 IN | SYSTOLIC BLOOD PRESSURE: 100 MMHG

## 2023-03-17 DIAGNOSIS — I71.43 INFRARENAL ABDOMINAL AORTIC ANEURYSM (AAA) WITHOUT RUPTURE: Primary | ICD-10-CM

## 2023-03-18 NOTE — PROGRESS NOTES
Outpatient Consultation / H&P    Date of Consultation:  3/17/2023    PCP:  Austen Jackson     Referring Provider:  Dr. Isatu Mccarthy     Chief Complaint:   Chief Complaint   Patient presents with    Other     Patient coming in to discuss cta done 3/13/2023. Ref by DR Louann Curry        History of Present Illness: We are asked to see this patient in consultation by Dr. Isatu Mccarthy regarding an AAA. Shravan Price is a 76 y.o. male who underwent recent CT scan for f/u known AAA. Prior CT 11/[facility] at [facility] reported 4.8cm AAA. Current scan reported as 4.7cm. Patient denies any abdominal or back pain. Past Medical History:  Past Medical History:   Diagnosis Date    Benign prostatic hyperplasia with nocturia 11/8/2017    CAD (coronary artery disease)     Managed by cardiology([facility]):Dr. Jose Moore    COPD (chronic obstructive pulmonary disease) ([facility] [address]     Diabetes mellitus ([facility] [address]     H/O colonoscopy 05/17/2019    Dr. Zully pathak    HTN (hypertension)     Managed by cardiology([facility]):Dr. Jose Moore    Hyperlipidemia     Managed by cardiology([facility]):Dr. Jose Moore    PSA elevation 2018    Under care of urology:Dr. King:[facility]. Bx advised if >5.0 PSA. Smoker        Past Surgical History:  Past Surgical History:   Procedure Laterality Date    COLONOSCOPY      CORONARY ANGIOPLASTY      TURP N/A 2/1/2021    CYSTOSCOPY , BIPOLAR TRANSURETHRAL RESECTION OF PROSTATE performed by Pedro Gray MD at [address] [place] [address] Medications:   Prior to Admission medications    Medication Sig Start Date End Date Taking?  Authorizing Provider   lisinopril (PRINIVIL;ZESTRIL) 2.5 MG tablet Take 2.5 mg by mouth daily 1/5/23  Yes Historical Provider, MD   atorvastatin (LIPITOR) 40 MG tablet TAKE 1 TABLET BY MOUTH ONce nightly 9/20/21  Yes RUTHANN Matute - ADAM   metFORMIN (GLUCOPHAGE-XR) 500 MG extended release tablet TAKE 1 TABLET BY MOUTH TWO TIMES A DAY 4/5/21  Yes Jack Sunshine MD tamsulosin (FLOMAX) 0.4 MG capsule TAKE 1 CAPSULE BY MOUTH ONCE DAILY 3/26/21  Yes Crystal Hernández MD   Handicap Placard MISC by Does not apply route Duration:5years 11/29/17  Yes Emili Toth MD   aspirin 81 MG chewable tablet Take 1 tablet by mouth daily 6/10/16  Yes Julian Causey MD   hydroCHLOROthiazide (MICROZIDE) 12.5 MG capsule Take 12.5 mg by mouth daily  Patient not taking: Reported on 3/17/2023    Historical Provider, MD   isosorbide mononitrate (IMDUR) 30 MG extended release tablet Take 1 tablet by mouth daily  Patient not taking: No sig reported 9/20/21   RUTHANN Moran CNP   metoprolol tartrate (LOPRESSOR) 25 MG tablet Take 1 tablet by mouth 2 times daily  Patient not taking: Reported on 3/17/2023 8/31/21   RUTHANN Moran CNP   LEVEMIR FLEXTOUCH 100 UNIT/ML injection pen INJECT 25 UNITS SUBCUTANEOUSLY NIGHTLY  Patient not taking: No sig reported 5/5/21   Emili Toth MD   insulin glargine (LANTUS SOLOSTAR) 100 UNIT/ML injection pen Inject 25 Units into the skin nightly  Patient not taking: No sig reported 3/15/21   Emili Toth MD   insulin glargine (BASAGLAR KWIKPEN) 100 UNIT/ML injection pen Inject 25 Units into the skin nightly Inject 25 Units into the skin nightly  Patient not taking: No sig reported 3/11/21   Emili Toth MD   INSULIN ASPART SC Inject into the skin UNKNOWN INSULIN  Patient not taking: No sig reported    Historical Provider, MD        Allergies:  Valium      Social History:      Social History     Socioeconomic History    Marital status:      Spouse name: Not on file    Number of children: Not on file    Years of education: Not on file    Highest education level: Not on file   Occupational History    Occupation: Retired:   Tobacco Use    Smoking status: Every Day     Packs/day: 1.00     Years: 30.00     Pack years: 30.00     Types: Cigarettes    Smokeless tobacco: Never    Tobacco comments:     Patient states he quit smoking 3 weeks ago    Vaping Use    Vaping Use: Never used   Substance and Sexual Activity    Alcohol use: No    Drug use: No    Sexual activity: Yes     Partners: Female     Comment: single- 5 children    Other Topics Concern    Not on file   Social History Narrative    Not on file     Social Determinants of Health     Financial Resource Strain: Not on file   Food Insecurity: Not on file   Transportation Needs: Not on file   Physical Activity: Not on file   Stress: Not on file   Social Connections: Not on file   Intimate Partner Violence: Not on file   Housing Stability: Not on file       Family History:        Problem Relation Age of Onset    No Known Problems Mother     No Known Problems Father     No Known Problems Sister     Heart Disease Brother     No Known Problems Maternal Grandmother     No Known Problems Maternal Grandfather     No Known Problems Paternal Grandmother     No Known Problems Paternal Grandfather        Review of Systems:  A 14 point review of systems was completed. Pertinent positives identified in the HPI, all other review of systems negative. Physical Examination:    BP (!) 100/58 (Site: Left Upper Arm)   Ht 5' 10\" (1.778 m)   Wt 266 lb (120.7 kg)   BMI 38.17 kg/m²     Weight: 266 lb (120.7 kg)       General appearance: NAD  Eyes: PERRLA  Neck: no JVD, no lymphadenopathy. Respiratory: effort is unlabored, no crackles, wheezes or rubs. Cardiovascular: regular, no murmur. No carotid bruits. Pulses:    femoral   RIGHT 2   LEFT 2   GI: abdomen soft, nondistended, no organomegaly. Musculoskeletal: strength and tone normal.  Extremities: warm and pink. Skin: no dermatitis or ulceration. Neuro/psychiatric: grossly intact. MEDICAL DECISION MAKING/TESTING        CT: images personally reviewed and interpreted. 4/7cmm infrarenal AAA, somewhat short neck. Assessment:      Diagnosis Orders   1.  Infrarenal abdominal aortic aneurysm (AAA) without rupture          Asymptomatic AAA, size below threshold for repair. Size relatively stable over past 2 years by CT imaging. Recommendations/Plan:  Repeat CT in one year. Repair when/if size > 5.5cm. Discussed risk of rupture which is currently low. Prompt evaluation and repeat imaging for any new abdominal/back pain.        Janette Lopez MD, FACS

## 2023-05-24 PROBLEM — E11.9 TYPE 2 DIABETES MELLITUS WITHOUT COMPLICATION, WITHOUT LONG-TERM CURRENT USE OF INSULIN (HCC): Status: ACTIVE | Noted: 2020-11-29

## 2023-05-24 PROBLEM — G47.33 OBSTRUCTIVE SLEEP APNEA: Status: ACTIVE | Noted: 2022-12-24

## 2023-05-26 NOTE — TELEPHONE ENCOUNTER
Notified patient of this. Procedure     ECG 12 Lead    Date/Time: 5/26/2023 9:50 AM  Performed by: Angie Duncan APRN  Authorized by: Angie Duncan APRN   Comparison: compared with previous ECG   Rhythm: sinus rhythm  Comments: QTc was fine

## (undated) DEVICE — HF-RESECTION ELECTRODE PLASMALOOP LOOP, LARGE, 24 FR., 12°/16°, ESG TURIS: Brand: OLYMPUS

## (undated) DEVICE — Z INACTIVE USE 2660664 SOLUTION IRRIG 3000ML 0.9% SOD CHL USP UROMATIC PLAS CONT

## (undated) DEVICE — GAUZE,SPONGE,4"X4",16PLY,XRAY,STRL,LF: Brand: MEDLINE

## (undated) DEVICE — BASIC SINGLE BASIN 1-LF: Brand: MEDLINE INDUSTRIES, INC.

## (undated) DEVICE — JELLY LUBE BTL MDS032275 4OZ

## (undated) DEVICE — PREP TRAY 10X5X2: Brand: MEDLINE INDUSTRIES, INC.

## (undated) DEVICE — SPONGE GZ W4XL4IN COT 12 PLY TYP VII WVN C FLD DSGN

## (undated) DEVICE — TUBING IRRIG L96IN DIA0.241IN L BOR T-U-R W/ NVENT PIERCING

## (undated) DEVICE — SYRINGE MED 30ML STD CLR PLAS LUERLOCK TIP N CTRL DISP

## (undated) DEVICE — PACK,CYSTOSCOPY,PK III,AURORA: Brand: MEDLINE

## (undated) DEVICE — CATHETER URETH 22FR 30CC BLLN F 3 W SPEC M RND TIP TWO

## (undated) DEVICE — HF-RESECTION ELECTRODE PLASMA-OVALBUTTON BUTTON, OVAL, 24 FR., 12°-30°, ESG TURIS: Brand: OLYMPUS

## (undated) DEVICE — SOLUTION IV IRRIG WATER 1000ML POUR BRL 2F7114

## (undated) DEVICE — GLOVE SURG SZ 65 THK91MIL LTX FREE SYN POLYISOPRENE

## (undated) DEVICE — TRAY PREP DRY W/ PREM GLV 2 APPL 6 SPNG 2 UNDPD 1 OVERWRAP

## (undated) DEVICE — BAG DRNGE 2000ML UROLOGY ANTI REFLX CHMBR SAMP PRT

## (undated) DEVICE — GARMENT,MEDLINE,DVT,INT,CALF,MED, GEN2: Brand: MEDLINE

## (undated) DEVICE — 60 ML SYRINGE,CATHETER TIP: Brand: MONOJECT